# Patient Record
Sex: FEMALE | Race: WHITE | NOT HISPANIC OR LATINO | Employment: PART TIME | URBAN - METROPOLITAN AREA
[De-identification: names, ages, dates, MRNs, and addresses within clinical notes are randomized per-mention and may not be internally consistent; named-entity substitution may affect disease eponyms.]

---

## 2017-01-06 ENCOUNTER — COMMUNICATION - HEALTHEAST (OUTPATIENT)
Dept: FAMILY MEDICINE | Facility: CLINIC | Age: 57
End: 2017-01-06

## 2017-01-06 DIAGNOSIS — M79.7 FIBROMYALGIA: ICD-10-CM

## 2017-01-27 ENCOUNTER — COMMUNICATION - HEALTHEAST (OUTPATIENT)
Dept: FAMILY MEDICINE | Facility: CLINIC | Age: 57
End: 2017-01-27

## 2017-02-23 ENCOUNTER — COMMUNICATION - HEALTHEAST (OUTPATIENT)
Dept: FAMILY MEDICINE | Facility: CLINIC | Age: 57
End: 2017-02-23

## 2017-03-29 ENCOUNTER — COMMUNICATION - HEALTHEAST (OUTPATIENT)
Dept: FAMILY MEDICINE | Facility: CLINIC | Age: 57
End: 2017-03-29

## 2017-05-05 ENCOUNTER — COMMUNICATION - HEALTHEAST (OUTPATIENT)
Dept: FAMILY MEDICINE | Facility: CLINIC | Age: 57
End: 2017-05-05

## 2017-05-19 ENCOUNTER — COMMUNICATION - HEALTHEAST (OUTPATIENT)
Dept: FAMILY MEDICINE | Facility: CLINIC | Age: 57
End: 2017-05-19

## 2017-05-19 DIAGNOSIS — M79.7 FIBROMYALGIA: ICD-10-CM

## 2017-06-19 ENCOUNTER — COMMUNICATION - HEALTHEAST (OUTPATIENT)
Dept: FAMILY MEDICINE | Facility: CLINIC | Age: 57
End: 2017-06-19

## 2017-06-19 DIAGNOSIS — M79.7 FIBROMYALGIA: ICD-10-CM

## 2017-07-26 ENCOUNTER — COMMUNICATION - HEALTHEAST (OUTPATIENT)
Dept: FAMILY MEDICINE | Facility: CLINIC | Age: 57
End: 2017-07-26

## 2017-07-26 DIAGNOSIS — M79.7 FIBROMYALGIA: ICD-10-CM

## 2017-09-03 ENCOUNTER — OFFICE VISIT - HEALTHEAST (OUTPATIENT)
Dept: FAMILY MEDICINE | Facility: CLINIC | Age: 57
End: 2017-09-03

## 2017-09-03 DIAGNOSIS — R21 RASH/SKIN ERUPTION: ICD-10-CM

## 2018-01-12 ENCOUNTER — OFFICE VISIT - HEALTHEAST (OUTPATIENT)
Dept: FAMILY MEDICINE | Facility: CLINIC | Age: 58
End: 2018-01-12

## 2018-01-12 ENCOUNTER — RECORDS - HEALTHEAST (OUTPATIENT)
Dept: GENERAL RADIOLOGY | Facility: CLINIC | Age: 58
End: 2018-01-12

## 2018-01-12 DIAGNOSIS — M25.551 PAIN IN RIGHT HIP: ICD-10-CM

## 2018-01-12 DIAGNOSIS — M70.61 TROCHANTERIC BURSITIS, RIGHT HIP: ICD-10-CM

## 2018-01-12 DIAGNOSIS — M16.11 OSTEOARTHRITIS OF RIGHT HIP: ICD-10-CM

## 2018-01-12 DIAGNOSIS — M25.551 RIGHT HIP PAIN: ICD-10-CM

## 2018-01-26 ENCOUNTER — COMMUNICATION - HEALTHEAST (OUTPATIENT)
Dept: FAMILY MEDICINE | Facility: CLINIC | Age: 58
End: 2018-01-26

## 2018-03-19 ENCOUNTER — COMMUNICATION - HEALTHEAST (OUTPATIENT)
Dept: FAMILY MEDICINE | Facility: CLINIC | Age: 58
End: 2018-03-19

## 2018-03-19 DIAGNOSIS — M25.50 ARTHRALGIA: ICD-10-CM

## 2018-04-03 ENCOUNTER — RECORDS - HEALTHEAST (OUTPATIENT)
Dept: ADMINISTRATIVE | Facility: OTHER | Age: 58
End: 2018-04-03

## 2018-04-15 ENCOUNTER — COMMUNICATION - HEALTHEAST (OUTPATIENT)
Dept: FAMILY MEDICINE | Facility: CLINIC | Age: 58
End: 2018-04-15

## 2018-04-15 DIAGNOSIS — Z51.81 MEDICATION MONITORING ENCOUNTER: ICD-10-CM

## 2018-05-18 ENCOUNTER — AMBULATORY - HEALTHEAST (OUTPATIENT)
Dept: LAB | Facility: CLINIC | Age: 58
End: 2018-05-18

## 2018-05-18 DIAGNOSIS — Z51.81 MEDICATION MONITORING ENCOUNTER: ICD-10-CM

## 2018-05-18 LAB
ALBUMIN SERPL-MCNC: 3.8 G/DL (ref 3.5–5)
ALP SERPL-CCNC: 57 U/L (ref 45–120)
ALT SERPL W P-5'-P-CCNC: 20 U/L (ref 0–45)
AST SERPL W P-5'-P-CCNC: 21 U/L (ref 0–40)
BILIRUB DIRECT SERPL-MCNC: 0.2 MG/DL
BILIRUB SERPL-MCNC: 0.8 MG/DL (ref 0–1)
PROT SERPL-MCNC: 7.5 G/DL (ref 6–8)

## 2018-06-14 ENCOUNTER — OFFICE VISIT - HEALTHEAST (OUTPATIENT)
Dept: FAMILY MEDICINE | Facility: CLINIC | Age: 58
End: 2018-06-14

## 2018-06-14 DIAGNOSIS — Z51.81 MEDICATION MONITORING ENCOUNTER: ICD-10-CM

## 2018-06-14 DIAGNOSIS — J44.1 OBSTRUCTIVE CHRONIC BRONCHITIS WITH EXACERBATION (H): ICD-10-CM

## 2018-06-14 DIAGNOSIS — J18.9 PNEUMONIA: ICD-10-CM

## 2018-06-14 DIAGNOSIS — Z12.31 VISIT FOR SCREENING MAMMOGRAM: ICD-10-CM

## 2018-06-14 LAB
ALBUMIN SERPL-MCNC: 3.6 G/DL (ref 3.5–5)
ALP SERPL-CCNC: 56 U/L (ref 45–120)
ALT SERPL W P-5'-P-CCNC: 19 U/L (ref 0–45)
AST SERPL W P-5'-P-CCNC: 24 U/L (ref 0–40)
BILIRUB DIRECT SERPL-MCNC: 0.2 MG/DL
BILIRUB SERPL-MCNC: 0.5 MG/DL (ref 0–1)
PROT SERPL-MCNC: 7 G/DL (ref 6–8)

## 2018-06-14 RX ORDER — BUDESONIDE AND FORMOTEROL FUMARATE DIHYDRATE 160; 4.5 UG/1; UG/1
2 AEROSOL RESPIRATORY (INHALATION) 2 TIMES DAILY
Qty: 10.2 INHALER | Refills: 1 | Status: SHIPPED | OUTPATIENT
Start: 2018-06-14 | End: 2022-11-11

## 2018-06-14 ASSESSMENT — MIFFLIN-ST. JEOR: SCORE: 1432.25

## 2018-07-06 ENCOUNTER — HOSPITAL ENCOUNTER (OUTPATIENT)
Dept: MAMMOGRAPHY | Facility: CLINIC | Age: 58
Discharge: HOME | End: 2018-07-06

## 2018-07-06 DIAGNOSIS — Z12.31 VISIT FOR SCREENING MAMMOGRAM: ICD-10-CM

## 2018-07-22 ENCOUNTER — OFFICE VISIT - HEALTHEAST (OUTPATIENT)
Dept: FAMILY MEDICINE | Facility: CLINIC | Age: 58
End: 2018-07-22

## 2018-07-22 DIAGNOSIS — J02.9 PHARYNGITIS, UNSPECIFIED ETIOLOGY: ICD-10-CM

## 2018-07-22 LAB — DEPRECATED S PYO AG THROAT QL EIA: NORMAL

## 2018-07-22 RX ORDER — FOLIC ACID 1 MG/1
1 TABLET ORAL
Status: SHIPPED | COMMUNITY
Start: 2018-04-03

## 2018-07-23 LAB — GROUP A STREP BY PCR: NORMAL

## 2018-07-25 ENCOUNTER — AMBULATORY - HEALTHEAST (OUTPATIENT)
Dept: LAB | Facility: CLINIC | Age: 58
End: 2018-07-25

## 2018-07-25 DIAGNOSIS — Z51.81 MEDICATION MONITORING ENCOUNTER: ICD-10-CM

## 2018-07-25 LAB
ALBUMIN SERPL-MCNC: 3.9 G/DL (ref 3.5–5)
ALP SERPL-CCNC: 53 U/L (ref 45–120)
ALT SERPL W P-5'-P-CCNC: 20 U/L (ref 0–45)
AST SERPL W P-5'-P-CCNC: 19 U/L (ref 0–40)
BILIRUB DIRECT SERPL-MCNC: 0.2 MG/DL
BILIRUB SERPL-MCNC: 0.6 MG/DL (ref 0–1)
PROT SERPL-MCNC: 7.3 G/DL (ref 6–8)

## 2018-08-14 ENCOUNTER — COMMUNICATION - HEALTHEAST (OUTPATIENT)
Dept: FAMILY MEDICINE | Facility: CLINIC | Age: 58
End: 2018-08-14

## 2018-08-14 DIAGNOSIS — J44.1 OBSTRUCTIVE CHRONIC BRONCHITIS WITH EXACERBATION (H): ICD-10-CM

## 2018-08-14 DIAGNOSIS — Z22.7 TB LUNG, LATENT: ICD-10-CM

## 2018-08-14 DIAGNOSIS — R05.8 PRODUCTIVE COUGH: ICD-10-CM

## 2018-08-14 DIAGNOSIS — J18.9 PNEUMONIA: ICD-10-CM

## 2018-10-26 ENCOUNTER — RECORDS - HEALTHEAST (OUTPATIENT)
Dept: ADMINISTRATIVE | Facility: OTHER | Age: 58
End: 2018-10-26

## 2018-12-19 ENCOUNTER — OFFICE VISIT - HEALTHEAST (OUTPATIENT)
Dept: FAMILY MEDICINE | Facility: CLINIC | Age: 58
End: 2018-12-19

## 2018-12-19 ENCOUNTER — COMMUNICATION - HEALTHEAST (OUTPATIENT)
Dept: FAMILY MEDICINE | Facility: CLINIC | Age: 58
End: 2018-12-19

## 2018-12-19 ENCOUNTER — RECORDS - HEALTHEAST (OUTPATIENT)
Dept: GENERAL RADIOLOGY | Facility: CLINIC | Age: 58
End: 2018-12-19

## 2018-12-19 DIAGNOSIS — M17.11 PATELLOFEMORAL ARTHRITIS OF RIGHT KNEE: ICD-10-CM

## 2018-12-19 DIAGNOSIS — M17.11 PRIMARY OSTEOARTHRITIS OF RIGHT KNEE: ICD-10-CM

## 2018-12-19 DIAGNOSIS — M17.11 UNILATERAL PRIMARY OSTEOARTHRITIS, RIGHT KNEE: ICD-10-CM

## 2018-12-31 ENCOUNTER — RECORDS - HEALTHEAST (OUTPATIENT)
Dept: ADMINISTRATIVE | Facility: OTHER | Age: 58
End: 2018-12-31

## 2019-01-11 ENCOUNTER — OFFICE VISIT - HEALTHEAST (OUTPATIENT)
Dept: FAMILY MEDICINE | Facility: CLINIC | Age: 59
End: 2019-01-11

## 2019-01-11 DIAGNOSIS — S83.206S ACUTE TORN MENISCUS OF KNEE, RIGHT, SEQUELA: ICD-10-CM

## 2019-01-11 DIAGNOSIS — M17.12 PRIMARY OSTEOARTHRITIS OF LEFT KNEE: ICD-10-CM

## 2019-01-11 DIAGNOSIS — J44.1 OBSTRUCTIVE CHRONIC BRONCHITIS WITH EXACERBATION (H): ICD-10-CM

## 2019-01-11 DIAGNOSIS — Z01.818 PRE-OP EXAM: ICD-10-CM

## 2019-01-11 DIAGNOSIS — M79.7 FIBROMYALGIA: ICD-10-CM

## 2019-01-11 LAB — HGB BLD-MCNC: 14 G/DL (ref 12–16)

## 2019-01-11 ASSESSMENT — MIFFLIN-ST. JEOR: SCORE: 1432.7

## 2019-01-17 ENCOUNTER — COMMUNICATION - HEALTHEAST (OUTPATIENT)
Dept: FAMILY MEDICINE | Facility: CLINIC | Age: 59
End: 2019-01-17

## 2019-01-18 ENCOUNTER — RECORDS - HEALTHEAST (OUTPATIENT)
Dept: ADMINISTRATIVE | Facility: OTHER | Age: 59
End: 2019-01-18

## 2019-01-29 ENCOUNTER — RECORDS - HEALTHEAST (OUTPATIENT)
Dept: ADMINISTRATIVE | Facility: OTHER | Age: 59
End: 2019-01-29

## 2019-03-18 ENCOUNTER — RECORDS - HEALTHEAST (OUTPATIENT)
Dept: ADMINISTRATIVE | Facility: OTHER | Age: 59
End: 2019-03-18

## 2019-03-20 ENCOUNTER — OFFICE VISIT - HEALTHEAST (OUTPATIENT)
Dept: FAMILY MEDICINE | Facility: CLINIC | Age: 59
End: 2019-03-20

## 2019-03-20 DIAGNOSIS — R39.15 URINARY URGENCY: ICD-10-CM

## 2019-03-20 DIAGNOSIS — R10.9 RIGHT FLANK DISCOMFORT: ICD-10-CM

## 2019-03-20 LAB
ALBUMIN UR-MCNC: NEGATIVE MG/DL
APPEARANCE UR: CLEAR
BACTERIA #/AREA URNS HPF: ABNORMAL HPF
BILIRUB UR QL STRIP: NEGATIVE
COLOR UR AUTO: YELLOW
GLUCOSE UR STRIP-MCNC: NEGATIVE MG/DL
HGB UR QL STRIP: ABNORMAL
KETONES UR STRIP-MCNC: NEGATIVE MG/DL
LEUKOCYTE ESTERASE UR QL STRIP: NEGATIVE
MUCOUS THREADS #/AREA URNS LPF: ABNORMAL LPF
NITRATE UR QL: NEGATIVE
PH UR STRIP: 5 [PH] (ref 5–8)
RBC #/AREA URNS AUTO: ABNORMAL HPF
SP GR UR STRIP: 1.02 (ref 1–1.03)
SQUAMOUS #/AREA URNS AUTO: ABNORMAL LPF
TRANS CELLS #/AREA URNS HPF: ABNORMAL LPF
UROBILINOGEN UR STRIP-ACNC: ABNORMAL
WBC #/AREA URNS AUTO: ABNORMAL HPF

## 2019-03-22 ENCOUNTER — SURGERY - HEALTHEAST (OUTPATIENT)
Dept: UROLOGY | Facility: CLINIC | Age: 59
End: 2019-03-22

## 2019-03-22 ENCOUNTER — PATIENT OUTREACH (OUTPATIENT)
Dept: CARE COORDINATION | Facility: CLINIC | Age: 59
End: 2019-03-22

## 2019-03-22 ENCOUNTER — OFFICE VISIT - HEALTHEAST (OUTPATIENT)
Dept: UROLOGY | Facility: CLINIC | Age: 59
End: 2019-03-22

## 2019-03-22 ENCOUNTER — COMMUNICATION - HEALTHEAST (OUTPATIENT)
Dept: SCHEDULING | Facility: CLINIC | Age: 59
End: 2019-03-22

## 2019-03-22 DIAGNOSIS — R11.2 NAUSEA AND VOMITING, INTRACTABILITY OF VOMITING NOT SPECIFIED, UNSPECIFIED VOMITING TYPE: ICD-10-CM

## 2019-03-22 DIAGNOSIS — N13.2 HYDRONEPHROSIS WITH URINARY OBSTRUCTION DUE TO URETERAL CALCULUS: ICD-10-CM

## 2019-03-22 DIAGNOSIS — N23 RENAL COLIC: ICD-10-CM

## 2019-03-22 DIAGNOSIS — N20.1 CALCULUS OF URETER: ICD-10-CM

## 2019-03-22 LAB
ALBUMIN UR-MCNC: NEGATIVE MG/DL
APPEARANCE UR: CLEAR
BILIRUB UR QL STRIP: NEGATIVE
COLOR UR AUTO: YELLOW
GLUCOSE UR STRIP-MCNC: NEGATIVE MG/DL
HGB UR QL STRIP: ABNORMAL
KETONES UR STRIP-MCNC: NEGATIVE MG/DL
LEUKOCYTE ESTERASE UR QL STRIP: NEGATIVE
NITRATE UR QL: NEGATIVE
PH UR STRIP: 6.5 [PH] (ref 5–8)
SP GR UR STRIP: 1.01 (ref 1–1.03)
UROBILINOGEN UR STRIP-ACNC: ABNORMAL

## 2019-03-22 NOTE — PROGRESS NOTES
Clinic Care Coordination Contact  [facility]/Voicemail    Referral Source: ED Follow-Up  Clinical Data: Care Coordinator Outreach  Outreach attempted x 1.  Left message on voicemail with call back information and requested return call.  Plan:  Care Coordinator will try to reach patient again in 1-2 business days.  Janice Pro,   [facility]  [email or web address]  [phone]

## 2019-03-25 ENCOUNTER — SURGERY - HEALTHEAST (OUTPATIENT)
Dept: SURGERY | Facility: CLINIC | Age: 59
End: 2019-03-25

## 2019-03-25 ENCOUNTER — ANESTHESIA - HEALTHEAST (OUTPATIENT)
Dept: SURGERY | Facility: CLINIC | Age: 59
End: 2019-03-25

## 2019-03-25 ASSESSMENT — MIFFLIN-ST. JEOR: SCORE: 1403.25

## 2019-04-22 ENCOUNTER — COMMUNICATION - HEALTHEAST (OUTPATIENT)
Dept: FAMILY MEDICINE | Facility: CLINIC | Age: 59
End: 2019-04-22

## 2019-04-29 ENCOUNTER — COMMUNICATION - HEALTHEAST (OUTPATIENT)
Dept: FAMILY MEDICINE | Facility: CLINIC | Age: 59
End: 2019-04-29

## 2019-05-02 ENCOUNTER — DOCUMENTATION ONLY (OUTPATIENT)
Dept: OTHER | Facility: CLINIC | Age: 59
End: 2019-05-02

## 2019-05-02 ENCOUNTER — AMBULATORY - HEALTHEAST (OUTPATIENT)
Dept: OTHER | Facility: CLINIC | Age: 59
End: 2019-05-02

## 2019-07-22 ENCOUNTER — OFFICE VISIT - HEALTHEAST (OUTPATIENT)
Dept: FAMILY MEDICINE | Facility: CLINIC | Age: 59
End: 2019-07-22

## 2019-07-22 DIAGNOSIS — I73.9 CLAUDICATION (H): ICD-10-CM

## 2019-07-22 DIAGNOSIS — M79.661 RIGHT CALF PAIN: ICD-10-CM

## 2019-07-24 ENCOUNTER — HOSPITAL ENCOUNTER (OUTPATIENT)
Dept: ULTRASOUND IMAGING | Facility: CLINIC | Age: 59
Discharge: HOME | End: 2019-07-24
Attending: FAMILY MEDICINE

## 2019-07-24 DIAGNOSIS — M79.661 RIGHT CALF PAIN: ICD-10-CM

## 2019-07-24 DIAGNOSIS — I73.9 CLAUDICATION (H): ICD-10-CM

## 2019-08-01 ENCOUNTER — RECORDS - HEALTHEAST (OUTPATIENT)
Dept: ADMINISTRATIVE | Facility: OTHER | Age: 59
End: 2019-08-01

## 2019-08-27 ENCOUNTER — RECORDS - HEALTHEAST (OUTPATIENT)
Dept: ADMINISTRATIVE | Facility: OTHER | Age: 59
End: 2019-08-27

## 2019-09-10 ENCOUNTER — RECORDS - HEALTHEAST (OUTPATIENT)
Dept: ADMINISTRATIVE | Facility: OTHER | Age: 59
End: 2019-09-10

## 2019-11-29 ENCOUNTER — OFFICE VISIT - HEALTHEAST (OUTPATIENT)
Dept: FAMILY MEDICINE | Facility: CLINIC | Age: 59
End: 2019-11-29

## 2019-11-29 ENCOUNTER — RECORDS - HEALTHEAST (OUTPATIENT)
Dept: GENERAL RADIOLOGY | Facility: CLINIC | Age: 59
End: 2019-11-29

## 2019-11-29 DIAGNOSIS — M25.531 PAIN IN RIGHT WRIST: ICD-10-CM

## 2019-11-29 DIAGNOSIS — M25.531 RIGHT WRIST PAIN: ICD-10-CM

## 2019-11-29 DIAGNOSIS — M19.90 INFLAMMATORY ARTHRITIS: ICD-10-CM

## 2019-12-05 ENCOUNTER — COMMUNICATION - HEALTHEAST (OUTPATIENT)
Dept: FAMILY MEDICINE | Facility: CLINIC | Age: 59
End: 2019-12-05

## 2019-12-17 ENCOUNTER — OFFICE VISIT - HEALTHEAST (OUTPATIENT)
Dept: FAMILY MEDICINE | Facility: CLINIC | Age: 59
End: 2019-12-17

## 2019-12-17 DIAGNOSIS — J18.9 PNEUMONIA OF LEFT UPPER LOBE DUE TO INFECTIOUS ORGANISM: ICD-10-CM

## 2019-12-17 DIAGNOSIS — R50.9 FEVER, UNSPECIFIED FEVER CAUSE: ICD-10-CM

## 2019-12-17 DIAGNOSIS — J44.1 OBSTRUCTIVE CHRONIC BRONCHITIS WITH EXACERBATION (H): ICD-10-CM

## 2019-12-17 DIAGNOSIS — R05.9 COUGH: ICD-10-CM

## 2019-12-17 LAB
FLUAV AG SPEC QL IA: NORMAL
FLUBV AG SPEC QL IA: NORMAL

## 2019-12-19 ENCOUNTER — OFFICE VISIT - HEALTHEAST (OUTPATIENT)
Dept: FAMILY MEDICINE | Facility: CLINIC | Age: 59
End: 2019-12-19

## 2019-12-19 DIAGNOSIS — J18.9 PNEUMONIA OF LEFT UPPER LOBE DUE TO INFECTIOUS ORGANISM: ICD-10-CM

## 2019-12-19 DIAGNOSIS — J44.1 OBSTRUCTIVE CHRONIC BRONCHITIS WITH EXACERBATION (H): ICD-10-CM

## 2019-12-19 DIAGNOSIS — M06.9 RHEUMATOID ARTHRITIS, INVOLVING UNSPECIFIED SITE, UNSPECIFIED RHEUMATOID FACTOR PRESENCE: ICD-10-CM

## 2019-12-19 ASSESSMENT — MIFFLIN-ST. JEOR: SCORE: 1393.69

## 2020-01-17 ENCOUNTER — AMBULATORY - HEALTHEAST (OUTPATIENT)
Dept: FAMILY MEDICINE | Facility: CLINIC | Age: 60
End: 2020-01-17

## 2020-01-17 RX ORDER — PREGABALIN 75 MG/1
75 CAPSULE ORAL DAILY
Status: SHIPPED | COMMUNITY
Start: 2020-01-17

## 2020-01-20 ENCOUNTER — OFFICE VISIT - HEALTHEAST (OUTPATIENT)
Dept: FAMILY MEDICINE | Facility: CLINIC | Age: 60
End: 2020-01-20

## 2020-01-20 ENCOUNTER — RECORDS - HEALTHEAST (OUTPATIENT)
Dept: GENERAL RADIOLOGY | Facility: CLINIC | Age: 60
End: 2020-01-20

## 2020-01-20 DIAGNOSIS — J18.9 PNEUMONIA OF LEFT UPPER LOBE DUE TO INFECTIOUS ORGANISM: ICD-10-CM

## 2020-01-20 DIAGNOSIS — J18.1 LOBAR PNEUMONIA, UNSPECIFIED ORGANISM (H): ICD-10-CM

## 2020-01-20 DIAGNOSIS — M25.531 RIGHT WRIST PAIN: ICD-10-CM

## 2020-01-20 DIAGNOSIS — J44.1 OBSTRUCTIVE CHRONIC BRONCHITIS WITH EXACERBATION (H): ICD-10-CM

## 2020-01-27 ENCOUNTER — RECORDS - HEALTHEAST (OUTPATIENT)
Dept: ADMINISTRATIVE | Facility: OTHER | Age: 60
End: 2020-01-27

## 2020-03-16 ENCOUNTER — RECORDS - HEALTHEAST (OUTPATIENT)
Dept: ADMINISTRATIVE | Facility: OTHER | Age: 60
End: 2020-03-16

## 2020-04-15 ENCOUNTER — RECORDS - HEALTHEAST (OUTPATIENT)
Dept: ADMINISTRATIVE | Facility: OTHER | Age: 60
End: 2020-04-15

## 2020-06-02 ENCOUNTER — OFFICE VISIT - HEALTHEAST (OUTPATIENT)
Dept: FAMILY MEDICINE | Facility: CLINIC | Age: 60
End: 2020-06-02

## 2020-06-02 DIAGNOSIS — Z01.818 PRE-OPERATIVE EXAMINATION: ICD-10-CM

## 2020-06-02 DIAGNOSIS — E66.01 MORBID OBESITY (H): ICD-10-CM

## 2020-06-02 DIAGNOSIS — M17.11 OSTEOARTHRITIS OF RIGHT KNEE, UNSPECIFIED OSTEOARTHRITIS TYPE: ICD-10-CM

## 2020-06-02 DIAGNOSIS — M06.9 RHEUMATOID ARTHRITIS, INVOLVING UNSPECIFIED SITE, UNSPECIFIED RHEUMATOID FACTOR PRESENCE: ICD-10-CM

## 2020-06-02 DIAGNOSIS — G47.30 SLEEP APNEA, UNSPECIFIED TYPE: ICD-10-CM

## 2020-06-02 DIAGNOSIS — J44.1 OBSTRUCTIVE CHRONIC BRONCHITIS WITH EXACERBATION (H): ICD-10-CM

## 2020-06-02 LAB
ANION GAP SERPL CALCULATED.3IONS-SCNC: 8 MMOL/L (ref 5–18)
BUN SERPL-MCNC: 14 MG/DL (ref 8–22)
CALCIUM SERPL-MCNC: 9.5 MG/DL (ref 8.5–10.5)
CHLORIDE BLD-SCNC: 102 MMOL/L (ref 98–107)
CO2 SERPL-SCNC: 28 MMOL/L (ref 22–31)
CREAT SERPL-MCNC: 0.87 MG/DL (ref 0.6–1.1)
ERYTHROCYTE [DISTWIDTH] IN BLOOD BY AUTOMATED COUNT: 12.5 % (ref 11–14.5)
GFR SERPL CREATININE-BSD FRML MDRD: >60 ML/MIN/1.73M2
GLUCOSE BLD-MCNC: 151 MG/DL (ref 70–125)
HCT VFR BLD AUTO: 41.9 % (ref 35–47)
HGB BLD-MCNC: 14 G/DL (ref 12–16)
MCH RBC QN AUTO: 30.7 PG (ref 27–34)
MCHC RBC AUTO-ENTMCNC: 33.4 G/DL (ref 32–36)
MCV RBC AUTO: 92 FL (ref 80–100)
PLATELET # BLD AUTO: 239 THOU/UL (ref 140–440)
PMV BLD AUTO: 8.6 FL (ref 7–10)
POTASSIUM BLD-SCNC: 4.5 MMOL/L (ref 3.5–5)
RBC # BLD AUTO: 4.56 MILL/UL (ref 3.8–5.4)
SODIUM SERPL-SCNC: 138 MMOL/L (ref 136–145)
WBC: 8.5 THOU/UL (ref 4–11)

## 2020-06-02 ASSESSMENT — MIFFLIN-ST. JEOR: SCORE: 1378.11

## 2020-06-03 LAB
ATRIAL RATE - MUSE: 83 BPM
DIASTOLIC BLOOD PRESSURE - MUSE: NORMAL
INTERPRETATION ECG - MUSE: NORMAL
P AXIS - MUSE: 55 DEGREES
PR INTERVAL - MUSE: 130 MS
QRS DURATION - MUSE: 74 MS
QT - MUSE: 374 MS
QTC - MUSE: 439 MS
R AXIS - MUSE: -29 DEGREES
SYSTOLIC BLOOD PRESSURE - MUSE: NORMAL
T AXIS - MUSE: 69 DEGREES
VENTRICULAR RATE- MUSE: 83 BPM

## 2020-06-04 ENCOUNTER — RECORDS - HEALTHEAST (OUTPATIENT)
Dept: ADMINISTRATIVE | Facility: OTHER | Age: 60
End: 2020-06-04

## 2020-06-10 ENCOUNTER — HOSPITAL ENCOUNTER (OUTPATIENT)
Dept: MRI IMAGING | Facility: CLINIC | Age: 60
Discharge: HOME | End: 2020-06-10

## 2020-06-10 DIAGNOSIS — M25.531 RIGHT WRIST PAIN: ICD-10-CM

## 2020-06-15 ENCOUNTER — RECORDS - HEALTHEAST (OUTPATIENT)
Dept: ADMINISTRATIVE | Facility: OTHER | Age: 60
End: 2020-06-15

## 2020-07-01 ENCOUNTER — RECORDS - HEALTHEAST (OUTPATIENT)
Dept: ADMINISTRATIVE | Facility: OTHER | Age: 60
End: 2020-07-01

## 2020-07-24 ENCOUNTER — RECORDS - HEALTHEAST (OUTPATIENT)
Dept: ADMINISTRATIVE | Facility: OTHER | Age: 60
End: 2020-07-24

## 2020-08-05 ENCOUNTER — RECORDS - HEALTHEAST (OUTPATIENT)
Dept: ADMINISTRATIVE | Facility: OTHER | Age: 60
End: 2020-08-05

## 2020-09-11 ENCOUNTER — RECORDS - HEALTHEAST (OUTPATIENT)
Dept: ADMINISTRATIVE | Facility: OTHER | Age: 60
End: 2020-09-11

## 2020-10-09 ENCOUNTER — COMMUNICATION - HEALTHEAST (OUTPATIENT)
Dept: FAMILY MEDICINE | Facility: CLINIC | Age: 60
End: 2020-10-09

## 2020-10-20 ENCOUNTER — HOSPITAL ENCOUNTER (OUTPATIENT)
Dept: MAMMOGRAPHY | Facility: CLINIC | Age: 60
Discharge: HOME | End: 2020-10-20
Attending: FAMILY MEDICINE

## 2020-10-20 DIAGNOSIS — Z12.31 VISIT FOR SCREENING MAMMOGRAM: ICD-10-CM

## 2020-11-09 ENCOUNTER — AMBULATORY - HEALTHEAST (OUTPATIENT)
Dept: NURSING | Facility: CLINIC | Age: 60
End: 2020-11-09

## 2021-01-26 ENCOUNTER — RECORDS - HEALTHEAST (OUTPATIENT)
Dept: ADMINISTRATIVE | Facility: OTHER | Age: 61
End: 2021-01-26

## 2021-01-29 ENCOUNTER — VIRTUAL VISIT (OUTPATIENT)
Dept: FAMILY MEDICINE | Facility: OTHER | Age: 61
End: 2021-01-29

## 2021-01-29 ENCOUNTER — IMMUNIZATION (OUTPATIENT)
Dept: NURSING | Facility: CLINIC | Age: 61
End: 2021-01-29
Payer: MEDICARE

## 2021-01-29 PROCEDURE — 91300 PR COVID VAC PFIZER DIL RECON 30 MCG/0.3 ML IM: CPT

## 2021-01-29 PROCEDURE — 0001A PR COVID VAC PFIZER DIL RECON 30 MCG/0.3 ML IM: CPT

## 2021-01-29 NOTE — PROGRESS NOTES
"Date: 2021 06:37:16  Clinician: Justine Mccrary  Clinician NPI: [id number]  Patient: Amy Sands  Patient : 1960  Patient Address: [address] [place] [address] [place] [address]  Patient Phone: [phone]  Visit Protocol: URI  Patient Summary:  Amy is a 60 year old ( : 1960 ) female who initiated a [facility] Visit for cold, sinus infection, or influenza. When asked the question \"Please sign me up to receive news, health information and promotions from [facility].\", Amy responded \"No\".    Amy states her symptoms started gradually 3-4 days ago.   Her symptoms consist of ear pain, a headache, rhinitis, and nasal congestion.   Symptom details     Nasal secretions: The color of her mucus is clear.    Headache: She states the headache is mild (1-3 on a 10 point pain scale).      Amy denies having chills, vomiting, myalgias, malaise, fever, cough, nausea, teeth pain, ageusia, diarrhea, wheezing, facial pain or pressure, anosmia, and sore throat. She also denies having recent facial or sinus surgery in the past 60 days, taking antibiotic medication in the past month, and double sickening (worsening symptoms after initial improvement). She is not experiencing dyspnea.    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 In the past 14 days, Amy has not worked or volunteered at a healthcare facility or group living setting.   In the past 14 days, she also has not lived in a congregate living setting.   Amy has not had a close contact with a laboratory-confirmed COVID-19 patient within 14 days of symptom onset.    Amy has been tested for COVID-19.      Date(s) of her COVID-19 test as reported by the patient (free text): 2020       Result of COVID-19 test as reported by the patient (free text): Neg       Type of test as reported by the patient (free text): Nasal        Amy has not received a " COVID-19 vaccine.   Pertinent medical history  She has not been told by her provider to avoid NSAIDs.   Amy does not get yeast infections when she takes antibiotics.   Amy does not have diabetes. She denies having immunosuppressive conditions (e.g., chemotherapy, HIV, organ transplant, long-term use of steroids or other immunosuppressive medications, splenectomy). She denies having congestive heart failure and severe COPD. She does not have asthma.   Amy does not need a return to work/school note.   Amy does not smoke or use smokeless tobacco.   Additional information as reported by the patient (free text): This feels like a sinus infection   Weight: 185 lbs    MEDICATIONS: methotrexate oral, ALLERGIES: NKDA  Clinician Response:  Dear Amy,  Based on the information provided, you have viral sinusitis, also known as a sinus infection. Sinus infections are caused by bacteria or a virus and symptoms are almost always identical. The difference between the 2 types of infections is timing.  Sinus infections start as viral infections and symptoms improve on their own in about 7 days. If symptoms have not improved after 7 days or have even worsened, a bacterial infection may have developed.  Medication information  Because you have a viral infection, antibiotics will not help you get better. Treating a viral infection with antibiotics could actually make you feel worse.  For more information on why I am not prescribing antibiotics, please watch this video: Antibiotics Aren't Always the Answer.  I am prescribing:     Fluticasone 50 mcg/actuation nasal spray. Inhale 2 sprays in each nostril 1 time per day; after 1 week, may adjust to 1 - 2 sprays in each nostril 1 time per day. This medication takes several days to start working, so keep taking it even if it doesn't help right away. There are no refills with this prescription.   Unless you are allergic to the over-the-counter medication(s) below, I recommend using:        Acetaminophen (Tylenol or store brand) oral tablet. Take 1-2 tablets by mouth every 4-6 hours to help with the discomfort.    A decongestant such as Sudafed PE or store brand.    A sinus irrigation kit such as Sinus Rinse, Neti Pot, SinuCleanse, or store brand. Be sure to use sterile or previously boiled water to prevent unwanted infections.      Oxymetazoline (Afrin or store brand) nasal spray. Use 1 spray in each nostril 2 times a day for a maximum of 3 days. Using this medication more frequently or longer than recommended may cause nasal congestion to reoccur or worsen. Sinus pressure occurs when the tissues lining your sinuses become swollen and inflamed. Afrin nasal spray decreases the swelling to provide the quickest and most effective relief from sinus pressure. Similar to Flonase, Afrin will help reduce swelling in your sinuses. Afrin works differently than Flonase, so be sure to use both nasal sprays.      Over-the-counter medications do not require a prescription. Ask the pharmacist if you have any questions.  Self care  Steps you can take to be as comfortable as possible:     Rest.    Drink plenty of fluids.    Take a warm shower to loosen congestion    Use a cool-mist humidifier.     When to seek care  Please be seen in a clinic or urgent care if any of the following occur:   New symptoms develop, or symptoms become worse   Call ahead before going to the clinic or urgent care.  Additional treatment plan   Your symptoms show that you may have coronavirus (COVID-19). This illness can cause fever, cough and trouble breathing. Many people get a mild case and get better on their own. Some people can get very sick.  What should I do?  We would like to test you for this virus.   1. Please call [phone] to schedule your visit. Explain that you were referred by [facility] to have a COVID-19 test. Be ready to share your [facility] visit ID number.  * If you need to schedule in [facility] please call  "[phone] or for [facility] [email or web address] please call [phone].  * If you need to schedule in the [place] area please call [phone]. [facility] employees call [phone].  The following will serve as your written order for this COVID Test, ordered by me, for the indication of suspected COVID [Z20.828]: The test will be ordered in [employer], our electronic health record, after you are scheduled. It will show as ordered and authorized by Jasiel Katz MD.  Order: COVID-19 (Coronavirus) PCR for SYMPTOMATIC testing from [facility].   2. When it's time for your COVID test:  Stay at least 6 feet away from others. (If someone will drive you to your test, stay in the backseat, as far away from the  as you can.)   Cover your mouth and nose with a mask, tissue or washcloth.  Go straight to the testing site. Don't make any stops on the way there or back.      3.Starting now: Stay home and away from others (self-isolate) until:   You've had no fever---and no medicine that reduces fever---for one full day (24 hours). And...   Your other symptoms have gotten better. For example, your cough or breathing has improved. And...   At least 10 days have passed since your symptoms started.       During this time, don't leave the house except for testing or medical care.   Stay in your own room, even for meals. Use your own bathroom if you can.   Stay away from others in your home. No hugging, kissing or shaking hands. No visitors.  Don't go to work, school or anywhere else.    Clean \"high touch\" surfaces often (doorknobs, counters, handles, etc.). Use a household cleaning spray or wipes. You'll find a full list of  on the EPA website: www.epa.gov/pesticide-registration/list-n-disinfectants-use-against-sars-cov-2.   Cover your mouth and nose with a mask, tissue or washcloth to avoid spreading germs.  Wash your hands and face often. Use soap and water.  Caregivers in these groups are at risk for severe illness due to COVID-19:  o " People 65 years and older  o People who live in a nursing home or long-term care facility  o People with chronic disease (lung, heart, cancer, diabetes, kidney, liver, immunologic)  o People who have a weakened immune system, including those who:   Are in cancer treatment  Take medicine that weakens the immune system, such as corticosteroids  Had a bone marrow or organ transplant  Have an immune deficiency  Have poorly controlled HIV or AIDS  Are obese (body mass index of 40 or higher)  Smoke regularly   o Caregivers should wear gloves while washing dishes, handling laundry and cleaning bedrooms and bathrooms.  o Use caution when washing and drying laundry: Don't shake dirty laundry, and use the warmest water setting that you can.  o For more tips, go to www.cdc.gov/coronavirus/2019-ncov/downloads/10Things.pdf.    4.Sign up for [employer]. We know it's scary to hear that you might have COVID-19. We want to track your symptoms to make sure you're okay over the next 2 weeks. Please look for an email from [employer]---this is a free, online program that we'll use to keep in touch. To sign up, follow the link in the email. Learn more at [email or web address]  How can I take care of myself?   Get lots of rest. Drink extra fluids (unless a doctor has told you not to).   Take Tylenol (acetaminophen) for fever or pain. If you have liver or kidney problems, ask your family doctor if it's okay to take Tylenol.   Adults can take either:    650 mg (two 325 mg pills) every 4 to 6 hours, or...   1,000 mg (two 500 mg pills) every 8 hours as needed.    Note: Don't take more than 3,000 mg in one day. Acetaminophen is found in many medicines (both prescribed and over-the-counter medicines). Read all labels to be sure you don't take too much.   For children, check the Tylenol bottle for the right dose. The dose is based on the child's age or weight.    If you have other health problems (like cancer, heart failure, an  organ transplant or severe kidney disease): Call your specialty clinic if you don't feel better in the next 2 days.       Know when to call 911. Emergency warning signs include:    Trouble breathing or shortness of breath Pain or pressure in the chest that doesn't go away Feeling confused like you haven't felt before, or not being able to wake up Bluish-colored lips or face.  Where can I get more information?   [facility] -- About COVID-19: [email or web address]   CDC -- What to Do If You're Sick: www.cdc.gov/coronavirus/2019-ncov/about/steps-when-sick.html   CDC -- Ending Home Isolation: www.cdc.gov/coronavirus/2019-ncov/hcp/disposition-in-home-patients.html   CDC -- Caring for Someone: www.cdc.gov/coronavirus/2019-ncov/if-you-are-sick/care-for-someone.html   [facility] -- Interim Guidance for Hospital Discharge to Home: www.[facility].[place]./diseases/coronavirus/hcp/hospdischarge.pdf   [facility] clinical trials (COVID-19 research studies): clinicalaffairs.[facility]-clinical-trials    Below are the COVID-19 hotlines at the [place] Department of Health ([facility]). Interpreters are available.    For health questions: Call 386-730-4145 or 1-740.996.8161 (7 a.m. to 7 p.m.) For questions about schools and childcare: Call 450-646-9455 or 1-255.814.6949 (7 a.m. to 7 p.m.)    Diagnosis: Acute upper respiratory infection, unspecified  Diagnosis ICD: J06.9  Prescription: fluticasone 50 mcg/actuation nasal spray,suspension 1 120 spray aerosol with adapter (grams), 30 days supply. Inhale 2 sprays in each nostril 1 time per day; after 1 week, may adjust to 1 - 2 sprays in each nostril 1 time per day.. Refills: 0, Refill as needed: no, Allow substitutions: yes  Pharmacy: [id number] - [phone] - [address] [place] [address]

## 2021-02-19 ENCOUNTER — IMMUNIZATION (OUTPATIENT)
Dept: NURSING | Facility: CLINIC | Age: 61
End: 2021-02-19
Attending: FAMILY MEDICINE
Payer: COMMERCIAL

## 2021-02-19 PROCEDURE — 0002A PR COVID VAC PFIZER DIL RECON 30 MCG/0.3 ML IM: CPT

## 2021-02-19 PROCEDURE — 91300 PR COVID VAC PFIZER DIL RECON 30 MCG/0.3 ML IM: CPT

## 2021-03-20 ENCOUNTER — HEALTH MAINTENANCE LETTER (OUTPATIENT)
Age: 61
End: 2021-03-20

## 2021-04-12 ENCOUNTER — RECORDS - HEALTHEAST (OUTPATIENT)
Dept: ADMINISTRATIVE | Facility: OTHER | Age: 61
End: 2021-04-12

## 2021-05-26 ENCOUNTER — RECORDS - HEALTHEAST (OUTPATIENT)
Dept: ADMINISTRATIVE | Facility: CLINIC | Age: 61
End: 2021-05-26

## 2021-05-26 NOTE — TELEPHONE ENCOUNTER
Clinic Care Coordination Contact  [facility]/Voicemail    Referral Source: ED Follow-Up  Clinical Data: Care Coordinator Outreach  Outreach attempted x 1.  Left message on voicemail with call back information and requested return call.  Plan:  Care Coordinator will try to reach patient again in 1-2 business days.  Janice Pro,   [facility]  [email or web address]  [phone]

## 2021-05-26 NOTE — PATIENT INSTRUCTIONS - HE
Patient Stated Goal: Know what to expect after surgery  Ureteroscopy    Ureteroscopy is a procedure which is done for clearance of stones from the ureter, kidney or both. There are no incisions involved. The procedure involves your surgeon placing a small scope into your urethra. This is the opening where urine leaves your body.  The surgeon watches as they carefully guide the scope to the stone(s).  Modern flexible ureteroscopes can be used to reach virtually any location within the urinary tract.     The size, shape and location of the stone determines how best to treat the stone(s).  Whenever possible, stones are removed in one piece.  Larger stones need to be broken using a laser before removing in smaller pieces.  The goal is to remove all stones and stone fragments from that side of the body in a single treatment.  Complete stone clearance is an important step to prevent future kidney stone episodes.    Surgery:    Same day outpatient procedure    30-60 minutes    Procedure done in hospital surgical suite    General anesthesia (you will be asleep during the procedure)     Antibiotic prior to surgery to prevent infection    Physician will visit with you and respond to any questions or concerns and consent will be signed prior to going to the operating room    Risks:    Infection - Preoperative antibiotics should prevent new infections but it is possible that unanticipated bacteria may be introduced at time of surgery or that the stones were actually chronically infected before surgery      Injury - The ureter may be injured during this procedure.  This is most likely to happen if the ureter was very inflamed before surgery or if a stone is very tightly impacted.  The surgeon will not aggressively treat a stone if this creates a risk of injury.        Inaccessible Stones -A single procedure is effective in 95% of cases, but if your ureter is very narrow or your kidney stone is very impacted, a stent will be  placed and the procedure stopped.  In 1-2 weeks after the ureter has relaxed, the patient will be brought back to surgery and the procedure can be safely performed.      Incomplete stone clearance -Occasionally stone or stone fragments may not be completely cleared.  These may pass on their own, which may cause discomfort.  Our goal is to remove all possible stones and fragments.    Stent:      An internal soft tube will be placed between the kidney and the bladder while in surgery (after the stone is cleared). The stent will keep the kidney draining.    What should I expect?     It is common for a stent to cause some irritation and discomfort.   You may have:      The need to urinate suddenly     The need to urinate often     Pain during urination     A dull backache, which may get worse during urination     Blood stained urine (like fruit punch) and occasional small clots    It s important to remember the stent is necessary and only temporary. To feel more comfortable:      Drink more than you normally would but you do not have to constantly  flush your kidneys     Limiting your activity may decrease irritation or bleeding    Ibuprofen - 2 tablets every 6-8 hours     Use pain medications as directed.    When is the stent removed?    Most stents are removed within 5 days to 2 weeks after a procedure.     How is the stent removed?     Your stent will be removed in the [facility] with a small telescope and a grasping tool.  It usually takes less than 1 minute to remove the stent.    What should I expect after the stent is removed?     You should feel normal by the next day    Some patients find:    An increase in back pain about an hour after the stent is removed as the kidney fills up with urine before it starts to empty.  It can be as uncomfortable as your initial stone episode.  Taking pain medications before stent removal may be helpful, but you would need someone else to drive you to and from your  appointment.    Bladder symptoms usually disappear by the next morning.    Small amounts of blood in the urine may be seen occasionally for up to a week.    Diet:      After surgery, there are no dietary restrictions - Drink to thirst, there is no need to increase intake of fluids, as this may increase nausea symptoms. Try to eat smaller, more frequent snacks, instead of large meals.    Activity:    Many people return to work within 1-2 days. Fatigue is normal for a couple of weeks following surgery. With increased activity you may experience more discomfort and you may notice more blood in your urine.      Post-Operative Symptom Control    While you recover from your procedure, you can take steps to ease your recovery.    Medications that prevent further episodes of severe pain and help stones pass: Take these as prescribed on a regular basis even if you are NOT in pain      Ibuprofen (Advil or Motrin) - Is available over the counter Take 2 (200mg) tablets every 6 hours until the stone passes.  o prevents spasm of the ureter.    o Decreases pain      Dramamine - (drowsy version, non-generic formulation) Is available over the counter and decreases spasm of the ureter.  Take 50mg at bedtime every night until the stone passes. In addition, take every 6 hours as needed.  Dramamine:  o Decreases nausea  o Decreases acute pain  o Decreases recurrence of pain for next 24 hours  o Will help you sleep        *This medication will cause increased drowsiness, do not drive or operate machinery for 6 hours      Flomax- Studies show that Flomax decreases irritation from stents.   o Take every day with food until stone passes even if you do not have pain  o Flomax does not relieve pain.        *This medication may cause nasal congestion or light-headedness      Detrol ( Tolterodine) - After surgery Detrol may decrease stent irritation and pelvic pain  o Take as prescribed     *This medication may cause dry mouth, constipation or  blurry vision. Stop medication if unable to urinate.    Medication that are taken as needed to manage break through symptoms: Take these ONLY as required and hopefully not at all      Narcotics (Percocet, Vicodin, Dilaudid)- take as prescribed for severe pain unrelieved by ibuprofen and dramamine  o Take as prescribed for severe pain  o Narcotics have significant side effects and only  cover-up  pain. They have no effect on cause of pain.  o Common side effects:  - Confusion, disorientation and sedation - DO NOT DRIVE OR OPERATE MACHINERY WITHIN 24 HOURS  - Nausea - take Dramamine or Zofran  or Haldol to help control  - Constipation  - Sleep disturbances      Ondansetron (Zofran)-  o Take as prescribed  o Reserve for severe nausea  o May cause constipation, start over the counter Miralax if needed to treat this    Haldol-  o Take as prescribed  o Reserve for severe nausea    Warning Signs/Symptoms - Please call the [facility] 24 hours a day at [phone] IMMEDIATELY if you experience any of these:    Fever greater than 100.1     Chills    Pain NOT CONTROLLED by pain medications    Heavy bleeding or large clots in urine (small clots can be normal)    Persistent nausea and/or vomiting    Post-Operative Follow up:    The stone(s) will be sent from surgery to a lab for composition analysis.  These results are usually available before a one month post-operative visit.  If you had laser treatment to break up your stone, you will usually be scheduled for a low dose CT scan prior to your one month appointment.  This scan allows your surgeon to confirm that all stone fragments were cleared at time of surgery and that there have been no complications.  These results along with possible labs and urine studies will help us develop an individualized plan to prevent new stones from forming and keep existing stones from enlarging.  This visit is usually scheduled about 1 month after your original surgery.    The  [facility] can respond to your questions or concerns 24 hours a day at [phone].

## 2021-05-26 NOTE — PROGRESS NOTES
Assessment/Plan:        Diagnoses and all orders for this visit:    Calculus of ureter  -     Cancel: Urinalysis Macroscopic  -     Urinalysis Macroscopic  -     Verify informed consent; Standing  -     Diet NPO; Standing  -     Place sequential compression device; Standing  -     XR Retrograde Pyelogram W or WO KUB Intraoperative; Standing  -     levoFLOXacin 500 mg/100 mL IVPB 500 mg (LEVAQUIN)  -     acetaminophen tablet 1,000 mg (TYLENOL)  -     sterile water IR irrigation solution 3,000 mL  -     Patient Stated Goal: Know what to expect after surgery  -     Ureteroscopy Education  -     XR Abdomen AP; Standing    Renal colic  -     Verify informed consent; Standing  -     Diet NPO; Standing  -     Place sequential compression device; Standing  -     XR Retrograde Pyelogram W or WO KUB Intraoperative; Standing  -     levoFLOXacin 500 mg/100 mL IVPB 500 mg (LEVAQUIN)  -     acetaminophen tablet 1,000 mg (TYLENOL)  -     sterile water IR irrigation solution 3,000 mL  -     Patient Stated Goal: Know what to expect after surgery  -     Ureteroscopy Education  -     XR Abdomen AP; Standing    Nausea and vomiting, intractability of vomiting not specified, unspecified vomiting type  -     Verify informed consent; Standing  -     Diet NPO; Standing  -     Place sequential compression device; Standing  -     XR Retrograde Pyelogram W or WO KUB Intraoperative; Standing  -     levoFLOXacin 500 mg/100 mL IVPB 500 mg (LEVAQUIN)  -     acetaminophen tablet 1,000 mg (TYLENOL)  -     sterile water IR irrigation solution 3,000 mL  -     Patient Stated Goal: Know what to expect after surgery  -     Ureteroscopy Education  -     XR Abdomen AP; Standing    Hydronephrosis with urinary obstruction due to ureteral calculus  -     Verify informed consent; Standing  -     Diet NPO; Standing  -     Place sequential compression device; Standing  -     XR Retrograde Pyelogram W or WO KUB Intraoperative; Standing  -     levoFLOXacin 500  mg/100 mL IVPB 500 mg (LEVAQUIN)  -     acetaminophen tablet 1,000 mg (TYLENOL)  -     sterile water IR irrigation solution 3,000 mL  -     Patient Stated Goal: Know what to expect after surgery  -     Ureteroscopy Education  -     XR Abdomen AP; Standing    Other orders  -     oxyCODONE (ROXICODONE) 5 MG immediate release tablet      Stone Management Plan  [facility] 3/22/2019   Current CT date 3/21/2019   Right sided stones? No   R Stone Event No current event   Left sided stones? Yes   L Number of ureteral stones 1   L GSD of ureteral stones 6   L Location of ureteral stone Distal   L Number of kidney stones  No renal stones   L Hydronephrosis Mild   L Stone Event New event   Diagnosis date 3/21/2019   Initial location of primary symptomatic stone Distal   Initial GSD of primary symptomatic stone 6   L MET Status Initiation   L Current Plan Observe             Subjective:      HPI  Ms. Amy Sands is a 58 y.o.  female presenting to the [facility] with history of onset of right flank pain on approximately 3/19/2019 accompanied by some urinary frequency and urgency.  She thought she might have a UTI.  Pain became severe 3/21/2019 and she presented to the emergency room where she was found to have 10-25 RBCs otherwise potassium 4.9 calcium 9.2 creatinine 0.92 white blood count 12,900 hemoglobin 12.9    Personal review of CT scan without IV or oral contrast obtained 3/21/2019 demonstrates a 6 x 4 mm left distal ureteral stone with mild hydroureteronephrosis.  There are no other stones seen.  Patient does have some small gallstones.    Patient comes at this time pain intermittently controlled.  She would like stone removed as soon as possible but on the other hand she just ate.  Informed her that this would not be feasible at this time with her having just eaten.  Ideally if she can control pain give her trial of passage.    UA today specific gravity 1.01 blood moderate pH  6.5- nitrate negative leukocytes.    Impression right distal ureteral stone with mild hydroureteronephrosis intermittent colic    Plan; trial of passage and if she has pain that is not tolerable she is to present to the emergency room here at [facility].  Tentatively she will be scheduled for clearance of stone early next week which she can cancel if her pain is under good control.    She does vomit when she has had Toradol both for her knee and recently when she was in.  She cannot take Dilaudid.  She has tolerated oxycodone.  She has available to her acetaminophen ibuprofen Dramamine and oxycodone and Zofran for symptom relief with her stone.    We did discuss clearance of stone with risks and benefits being detailed of ureteroscopic stone clearance including potential issues of urinary or systemic infection ureteral injury inaccessible stone incomplete stone clearance multiple surgeries and stent related symptoms of flank pain bladder pain urgency frequency and hematuria.  Patient verbalized understanding regards attempts at supportive therapy for stone passage as well as operative intervention should that be necessary on a semi-emergent  Or scheduled basis.    She is aware that if she in the difficulty surgical procedure of clearance of stone would be accomplished by Dr. Weston or Dr. Lopez.  She is tentatively scheduled for elective surgical removal of stone on Monday with Dr. Lopez.       ROS   Review of Systems  A 12 point comprehensive review of systems is negative except for HPI    Past Medical History:   Diagnosis Date     Arthritis      Chronic bronchitis (H)      Fibromyalgia      History of anesthesia complications     HARD WAKEUP AND LOW BP     PONV (postoperative nausea and vomiting)     motion sickness     Rosacea      Rotator cuff tendonitis, right      Sleep apnea     DOESNT USE CPAP       Past Surgical History:   Procedure Laterality Date     CARPAL TUNNEL RELEASE Bilateral       HYSTERECTOMY       KNEE ARTHROSCOPY Left      lysis of adhesions for bowel obstruction x 2       WA TOTAL KNEE ARTHROPLASTY Left 1/7/2016    Procedure: TOTAL KNEE  ARTHROPLASTY;  Surgeon: Boubacar Cortez MD;  Location: [facility];  Service: Orthopedics       Current Outpatient Medications   Medication Sig Dispense Refill     acetaminophen (TYLENOL) 500 MG tablet Take 2 tablets (1,000 mg total) by mouth 4 (four) times a day for 7 days. 56 tablet 0     albuterol (PROAIR HFA) 90 mcg/actuation inhaler Inhale 2 puffs every 6 (six) hours as needed for wheezing. 1 Inhaler 1     budesonide-formoterol (SYMBICORT) 160-4.5 mcg/actuation inhaler Inhale 2 puffs 2 (two) times a day. 10.2 Inhaler 1     budesonide-formoterol (SYMBICORT) 160-4.5 mcg/actuation inhaler Inhale 2 puffs.       budesonide-formoterol (SYMBICORT) 160-4.5 mcg/actuation inhaler Inhale 2 puffs 2 (two) times a day. 10.2 Inhaler 0     celecoxib (CELEBREX) 200 MG capsule TAKE 1 CAPSULE BY MOUTH EVERY DAY 30 capsule 0     dimenhyDRINATE (DRAMAMINE) 50 MG tablet Take 1 tablet (50 mg total) by mouth at bedtime for 7 days. 7 tablet 0     folic acid (FOLVITE) 1 MG tablet Take 1 mg by mouth.       gabapentin (NEURONTIN) 100 MG capsule Take 100 mg by mouth 3 (three) times a day.       ibuprofen (ADVIL,MOTRIN) 200 MG tablet Take 2 tablets (400 mg total) by mouth 4 (four) times a day for 7 days. 56 tablet 0     isoniazid (NYDRAZID) 300 MG tablet Take 1 tablet (300 mg total) by mouth daily. 30 tablet 8     LYRICA 75 mg capsule TAKE ONE CAPSULE BY MOUTH TWICE DAILY 60 capsule 0     methotrexate 2.5 MG tablet Take by mouth once a week.       methotrexate 2.5 MG tablet Take 12.5 mg by mouth.       ondansetron (ZOFRAN ODT) 4 MG disintegrating tablet Take 1 tablet (4 mg total) by mouth every 8 (eight) hours as needed for nausea. 12 tablet 0     oxyCODONE (ROXICODONE) 5 MG immediate release tablet Every 4-6 hours as needed if pain is not improved with acetaminophen  and ibuprofen. 12 tablet 0     pregabalin (LYRICA) 75 MG capsule Take 1 capsule (75 mg total) by mouth 2 (two) times a day. 60 capsule 1     pregabalin (LYRICA) 75 MG capsule Take 1 capsule (75 mg total) by mouth 2 (two) times a day. 180 capsule 1     pregabalin (LYRICA) 75 MG capsule Take 75 mg by mouth.       triamcinolone (KENALOG) 0.1 % cream Apply to affected areas two times daily 30 g 0     No current facility-administered medications for this visit.        Allergies   Allergen Reactions     Hydrocodone Nausea And Vomiting       Social History     Socioeconomic History     Marital status:      Spouse name: Not on file     Number of children: Not on file     Years of education: Not on file     Highest education level: Not on file   Occupational History     Not on file   Social Needs     Financial resource strain: Not on file     Food insecurity:     Worry: Not on file     Inability: Not on file     Transportation needs:     Medical: Not on file     Non-medical: Not on file   Tobacco Use     Smoking status: Passive Smoke Exposure - Never Smoker     Smokeless tobacco: Never Used   Substance and Sexual Activity     Alcohol use: No     Drug use: No     Sexual activity: Not on file   Lifestyle     Physical activity:     Days per week: Not on file     Minutes per session: Not on file     Stress: Not on file   Relationships     Social connections:     Talks on phone: Not on file     Gets together: Not on file     Attends [employer] service: Not on file     Active member of club or organization: Not on file     Attends meetings of clubs or organizations: Not on file     Relationship status: Not on file     Intimate partner violence:     Fear of current or ex partner: Not on file     Emotionally abused: Not on file     Physically abused: Not on file     Forced sexual activity: Not on file   Other Topics Concern     Not on file   Social History Narrative     Not on file       No family history on  file.    Objective:      Physical Exam  Vitals:    03/22/19 1401   Temp: 98  F (36.7  C)     General - well developed, well nourished, appropriate for age. Appears moderately uncomfortable   Heart - regular rate and rhythm, no murmur  Respiratory - normal effort, clear to auscultation, good air entry without adventitious noises  Abdomen - mildly obese soft, non-tender, no hepatosplenomegaly, no masses.   - right flank moderate tenderness, no suprapubic tenderness, kidney and bladder non-palpable  MSK - normal spinal curvature. no spinal tenderness. normal gait. muscular strength intact.  Neurology - cranial nerves II-XII grossly intact, normal sensation, no unsteadiness  Skin - intact, no bruising, no gouty tophi  Psych - oriented to time, place, and person, normal mood and affect.      Labs  Urinalysis POC (Office):  Nitrite, UA   Date Value Ref Range Status   03/22/2019 Negative Negative Final   03/21/2019 Negative Negative Final   03/20/2019 Negative Negative Final       Lab Urinalysis:  Blood, UA   Date Value Ref Range Status   03/22/2019 Moderate (!) Negative Final   03/21/2019 Small (!) Negative Final   03/20/2019 Trace (!) Negative Final     Nitrite, UA   Date Value Ref Range Status   03/22/2019 Negative Negative Final   03/21/2019 Negative Negative Final   03/20/2019 Negative Negative Final     Leukocytes, UA   Date Value Ref Range Status   03/22/2019 Negative Negative Final   03/21/2019 Small (!) Negative Final   03/20/2019 Negative Negative Final     pH, UA   Date Value Ref Range Status   03/22/2019 6.5 5.0 - 8.0 Final   03/21/2019 5.0 4.5 - 8.0 Final   03/20/2019 5.0 5.0 - 8.0 Final    and Acute Labs   CBC   WBC   Date Value Ref Range Status   03/21/2019 12.9 (H) 4.0 - 11.0 thou/uL Final   11/07/2016 9.7 4.0 - 11.0 thou/uL Final   05/31/2016 8.7 4.0 - 11.0 thou/uL Final   07/02/2015 8.3 4.0 - 11.0 thou/uL Final   12/04/2014 8.2 4.0 - 11.0 thou/uL Final   12/02/2014 7.8 4.0 - 11.0 thou/uL Final      Hemoglobin   Date Value Ref Range Status   03/21/2019 12.9 12.0 - 16.0 g/dL Final   01/11/2019 14.0 12.0 - 16.0 g/dL Final   11/07/2016 14.1 12.0 - 16.0 g/dL Final     Platelets   Date Value Ref Range Status   03/21/2019 241 140 - 440 thou/uL Final   11/07/2016 231 140 - 440 thou/uL Final   05/31/2016 237 140 - 440 thou/uL Final   , C Reactive Protein    CRP   Date Value Ref Range Status   03/21/2019 0.4 0.0 - 0.8 mg/dL Final    and Renal Panel  [facility]  Creatinine   Date Value Ref Range Status   03/21/2019 0.92 0.60 - 1.10 mg/dL Final   11/07/2016 0.82 0.60 - 1.10 mg/dL Final   05/31/2016 0.83 0.60 - 1.10 mg/dL Final     Potassium   Date Value Ref Range Status   03/21/2019 4.9 3.5 - 5.0 mmol/L Final   11/07/2016 4.8 3.5 - 5.0 mmol/L Final   05/31/2016 4.4 3.5 - 5.0 mmol/L Final     Calcium   Date Value Ref Range Status   03/21/2019 9.2 8.5 - 10.5 mg/dL Final   11/07/2016 9.8 8.5 - 10.5 mg/dL Final   05/31/2016 9.8 8.5 - 10.5 mg/dL Final

## 2021-05-27 NOTE — ANESTHESIA PREPROCEDURE EVALUATION
Anesthesia Evaluation      Patient summary reviewed   History of anesthetic complications (Slow to wake, PONV)     Airway   Mallampati: II  Neck ROM: full  Comment: Obese   Pulmonary - normal exam   (+) COPD (Chronic bronchitis, well controlled), sleep apnea on no CPAP, ,   (-) shortness of breath    ROS comment: Latent TB                         Cardiovascular - negative ROS and normal exam  Exercise tolerance: > or = 4 METS  Rhythm: regular  Rate: normal,         Neuro/Psych    (+) neuromuscular disease (Fibromyalgia),      Endo/Other    (+) arthritis, obesity (BMI 36),      GI/Hepatic/Renal    (+)   chronic renal disease (ureteral stone with mild hydroureteronephrosis),   (-) GERD          Dental - normal exam                        Anesthesia Plan  Planned anesthetic: general endotracheal  Glidescope    Reverse with Sugammadex    Scopolamine patch  Decadron  Zofran  ASA 2   Induction: intravenous   Anesthetic plan and risks discussed with: patient  Anesthesia plan special considerations: video-assisted, antiemetics,   Post-op plan: routine recovery

## 2021-05-27 NOTE — ANESTHESIA CARE TRANSFER NOTE
Last vitals:   Vitals:    03/25/19 1520   BP: 138/76   Pulse: 70   Resp: 14   Temp: 36.5  C (97.7  F)   SpO2: 100%     Patient's level of consciousness is drowsy  Spontaneous respirations: yes  Maintains airway independently: yes  Dentition unchanged: yes  Oropharynx: oropharynx clear of all foreign objects    QCDR Measures:  ASA# 20 - Surgical Safety Checklist: WHO surgical safety checklist completed prior to induction    PQRS# 430 - Adult PONV Prevention: 4558F - Pt received => 2 anti-emetic agents (different classes) preop & intraop  ASA# 8 - Peds PONV Prevention: NA - Not pediatric patient, not GA or 2 or more risk factors NOT present  PQRS# 424 - Nalini-op Temp Management: 4559F - At least one body temp DOCUMENTED => 35.5C or 95.9F within required timeframe  PQRS# 426 - PACU Transfer Protocol: - Transfer of care checklist used  ASA# 14 - Acute Post-op Pain: ASA14B - Patient did NOT experience pain >= 7 out of 10

## 2021-05-27 NOTE — ANESTHESIA POSTPROCEDURE EVALUATION
Patient: Amy Sands  CYSTOSCOPY, RIGHT URETEROSCOPY STENT INSERTION AND RETROGRADE PYELOGRAM  Anesthesia type: general    Patient location: PACU  Last vitals:   Vitals:    03/25/19 1600   BP: 139/76   Pulse: 76   Resp: 17   Temp:    SpO2: 97%     Post vital signs: stable  Level of consciousness: awake and responds to simple questions  Post-anesthesia pain: pain controlled  Post-anesthesia nausea and vomiting: no  Pulmonary: unassisted, return to baseline  Cardiovascular: stable and blood pressure at baseline  Hydration: adequate  Anesthetic events: no    QCDR Measures:  ASA# 11 - Nalini-op Cardiac Arrest: ASA11B - Patient did NOT experience unanticipated cardiac arrest  ASA# 12 - Nalini-op Mortality Rate: ASA12B - Patient did NOT die  ASA# 13 - PACU Re-Intubation Rate: ASA13B - Patient did NOT require a new airway mgmt  ASA# 10 - Composite Anes Safety: ASA10A - No serious adverse event    Additional Notes:

## 2021-05-28 ENCOUNTER — RECORDS - HEALTHEAST (OUTPATIENT)
Dept: ADMINISTRATIVE | Facility: CLINIC | Age: 61
End: 2021-05-28

## 2021-05-28 NOTE — TELEPHONE ENCOUNTER
Who is calling:  patient  Reason for Call:   Patient dropped paperwork off at the clinic on 4/25/2019 and is wondering how long she will have to wait for this.  Please advise  Date of last appointment with primary care:  3/22/2019  Okay to leave a detailed message: Yes

## 2021-05-30 ENCOUNTER — RECORDS - HEALTHEAST (OUTPATIENT)
Dept: ADMINISTRATIVE | Facility: CLINIC | Age: 61
End: 2021-05-30

## 2021-05-30 NOTE — PROGRESS NOTES
Patient ID: Amy Sands is a 58 y.o. female.  /68   Pulse 71   Wt 198 lb (89.8 kg)   SpO2 98%   BMI 36.21 kg/m      Assessment/Plan:                   Diagnoses and all orders for this visit:    Right calf pain  -     US Venous Leg Right; Future; Expected date: 07/22/2019  -     Cancel: US Ankle Brachial Indices; Future; Expected date: 07/22/2019    Claudication (H)  -     Cancel: US Ankle Brachial Indices; Future; Expected date: 07/22/2019      DISCUSSION  Differential includes: Muscle injury, blood clot, peripheral vascular disease with claudication, nerve impingement.    Most likely etiology would be muscle injury.  Will rule out blood clot by obtaining an ultrasound in a timely manner.  Will then evaluate with ankle-brachial indices to rule out peripheral vascular disease and consider further evaluation pending the results of that test.    Ultrasound revealed no clot, ankle-brachial indices revealed no peripheral vascular disease.  Patient contacted with results will proceed with EMG testing for physical therapy.  Subjective:     HPI    Amy Sands is a 58 y.o. female medical history includes fibromyalgia, rheumatoid arthritis, osteoarthritis leading to knee replacement, rosacea and kidney stones.    She is here today reporting history of right calf pain.  Patient reports pain has been going on for nearly 2 months.  Patient denies any specific injury starting pain.  She does not report any significant swelling or noticeable redness.  Pain is impacting walking and forcing her to walk with a limp.  Pain is located in the area just below the knee in the posterior portion of the leg.  Patient has never had previous similar problem.  She does report on occasion radiation of pain down further into the leg.  She has also noticed some mild tingling sensation but this has not been persistent.  She does not feel any muscular weakness.    Review of Systems  Complete review of systems is obtained.  Other  than the specific considerations noted above complete review of systems is negative.          Objective:   Medications:  Current Outpatient Medications   Medication Sig Note     albuterol (PROAIR HFA) 90 mcg/actuation inhaler Inhale 2 puffs every 6 (six) hours as needed for wheezing.      budesonide-formoterol (SYMBICORT) 160-4.5 mcg/actuation inhaler Inhale 2 puffs 2 (two) times a day.      folic acid (FOLVITE) 1 MG tablet Take 1 mg by mouth. 7/22/2018: Received from: [facility] Received Sig: Take 1 tablet by mouth once daily.     gabapentin (NEURONTIN) 100 MG capsule Take 100 mg by mouth 3 (three) times a day.      methotrexate 2.5 MG tablet Take by mouth once a week.      triamcinolone (KENALOG) 0.1 % cream Apply to affected areas two times daily        Allergies:  Allergies   Allergen Reactions     Dilaudid [Hydromorphone] Nausea And Vomiting     Hydrocodone Nausea And Vomiting     Toradol [Ketorolac] Nausea And Vomiting       Tobacco:   reports that she is a non-smoker but has been exposed to tobacco smoke. She has never used smokeless tobacco.     Physical Exam          /68   Pulse 71   Wt 198 lb (89.8 kg)   SpO2 98%   BMI 36.21 kg/m        General: Patient alert no signs of distress    Heart and lungs regular rate and rhythm no murmur, lungs clear without wheeze crackle or other focal sound    Examination of her leg reveals no bruising redness or swelling.  There is pain to palpation in the posterior musculature in the calf region.  It is nonfocal.  No defect or abnormalities palpated within this area.  I do not distinctly palpate pulsations within the feet but feet are warm with good capillary refill.  Sensation is intact using light touch.  Patient walks with a slight limp favoring the right leg.

## 2021-05-31 ENCOUNTER — RECORDS - HEALTHEAST (OUTPATIENT)
Dept: ADMINISTRATIVE | Facility: CLINIC | Age: 61
End: 2021-05-31

## 2021-05-31 VITALS — BODY MASS INDEX: 34.02 KG/M2 | WEIGHT: 189 LBS

## 2021-05-31 VITALS — WEIGHT: 194.9 LBS | BODY MASS INDEX: 35.08 KG/M2

## 2021-06-01 ENCOUNTER — RECORDS - HEALTHEAST (OUTPATIENT)
Dept: ADMINISTRATIVE | Facility: CLINIC | Age: 61
End: 2021-06-01

## 2021-06-01 VITALS — WEIGHT: 201.5 LBS | BODY MASS INDEX: 37.08 KG/M2 | HEIGHT: 62 IN

## 2021-06-01 VITALS — WEIGHT: 198 LBS | BODY MASS INDEX: 36.21 KG/M2

## 2021-06-02 ENCOUNTER — RECORDS - HEALTHEAST (OUTPATIENT)
Dept: ADMINISTRATIVE | Facility: CLINIC | Age: 61
End: 2021-06-02

## 2021-06-02 VITALS — HEIGHT: 62 IN | BODY MASS INDEX: 35.9 KG/M2 | WEIGHT: 195.11 LBS

## 2021-06-02 VITALS — HEIGHT: 62 IN | WEIGHT: 201.6 LBS | BODY MASS INDEX: 37.1 KG/M2

## 2021-06-02 VITALS — BODY MASS INDEX: 37.35 KG/M2 | WEIGHT: 204.2 LBS

## 2021-06-02 VITALS — BODY MASS INDEX: 37.22 KG/M2 | WEIGHT: 203.5 LBS

## 2021-06-03 VITALS — WEIGHT: 198 LBS | BODY MASS INDEX: 36.21 KG/M2

## 2021-06-03 NOTE — PROGRESS NOTES
Patient ID: Amy Sands is a 59 y.o. female.  /74   Pulse 76   Wt 190 lb (86.2 kg)   BMI 34.75 kg/m      Assessment/Plan:                   Diagnoses and all orders for this visit:    Inflammatory arthritis    Right wrist pain  -     XR Wrist Right 3 or More VWS; Future; Expected date: 11/29/2019  -     predniSONE (DELTASONE) 20 MG tablet; Take 20 mg by mouth daily.  Dispense: 5 tablet; Refill: 0          DISCUSSION  Examination of the x-ray reveals no obvious deformity.  She is prominent ulnar styloid near the area.  There is evidence of soft tissue swelling.  I suspect she has a flareup of underlying inflammatory arthritis.  We will place her on a short course of prednisone 20 mg once daily for 5 days.  Patient will continue all other medications as prior.  Based on her clinical course with this intervention we will decide on further course of action.  Subjective:     HPI    Amy Sands is a 59 y.o. female her medical history includes fibromyalgia, inflammatory arthritis which is on methotrexate under the direction of rheumatologist, osteoarthritis, rosacea and kidney stones.    She is here today concerned regarding 1 week history of right wrist pain.  Patient denies any specific injury.  She notes rather significant pain with limited range of motion and swelling in the right wrist.  The swelling seems to be just proximal to the wrist joint itself.  There is no redness.  She cannot move the wrist.  She does report some slight sensation of tingling.  She has no other joints that are red swollen or painful.  She otherwise feels well denies fevers chills night sweats abdominal pain nausea vomiting and diarrhea.      Review of Systems  Complete review of systems is obtained.  Other than the specific considerations noted above complete review of systems is negative.        Objective:   Medications:  Current Outpatient Medications   Medication Sig Note     albuterol (PROAIR HFA) 90 mcg/actuation inhaler  Inhale 2 puffs every 6 (six) hours as needed for wheezing.      budesonide-formoterol (SYMBICORT) 160-4.5 mcg/actuation inhaler Inhale 2 puffs 2 (two) times a day.      folic acid (FOLVITE) 1 MG tablet Take 1 mg by mouth. 7/22/2018: Received from: [facility] Received Sig: Take 1 tablet by mouth once daily.     gabapentin (NEURONTIN) 100 MG capsule Take 100 mg by mouth 3 (three) times a day.      methotrexate 2.5 MG tablet Take by mouth once a week.      triamcinolone (KENALOG) 0.1 % cream Apply to affected areas two times daily      predniSONE (DELTASONE) 20 MG tablet Take 20 mg by mouth daily.      Allergies:  Allergies   Allergen Reactions     Dilaudid [Hydromorphone] Nausea And Vomiting     Hydrocodone Nausea And Vomiting     Toradol [Ketorolac] Nausea And Vomiting     Tobacco:   reports that she is a non-smoker but has been exposed to tobacco smoke. She has never used smokeless tobacco.     Physical Exam      /74   Pulse 76   Wt 190 lb (86.2 kg)   BMI 34.75 kg/m        General: Patient alert no signs of distress    Examination of her wrist reveals focal soft tissue swelling near the ulnar styloid on the right wrist.  There is no associated redness.  The area is exquisitely tender.  She is limited range of motion with flexion extension of the wrist.  She is able to flex and extend the fingers with minimal difficulty.    Plain film was obtained.  Examination plain film reveals no obvious bony deformity.  There is evidence of soft tissue swelling.  She has a prominent ulnar styloid.

## 2021-06-04 VITALS
DIASTOLIC BLOOD PRESSURE: 80 MMHG | WEIGHT: 193 LBS | BODY MASS INDEX: 35.51 KG/M2 | TEMPERATURE: 98.4 F | SYSTOLIC BLOOD PRESSURE: 124 MMHG | HEART RATE: 73 BPM | OXYGEN SATURATION: 94 % | HEIGHT: 62 IN

## 2021-06-04 VITALS
RESPIRATION RATE: 21 BRPM | OXYGEN SATURATION: 92 % | HEART RATE: 80 BPM | SYSTOLIC BLOOD PRESSURE: 128 MMHG | WEIGHT: 193 LBS | BODY MASS INDEX: 35.3 KG/M2 | TEMPERATURE: 98.8 F | DIASTOLIC BLOOD PRESSURE: 82 MMHG

## 2021-06-04 VITALS
WEIGHT: 196 LBS | HEART RATE: 70 BPM | SYSTOLIC BLOOD PRESSURE: 128 MMHG | BODY MASS INDEX: 35.85 KG/M2 | OXYGEN SATURATION: 99 % | TEMPERATURE: 98.6 F | DIASTOLIC BLOOD PRESSURE: 76 MMHG

## 2021-06-04 VITALS
HEART RATE: 82 BPM | WEIGHT: 193.06 LBS | DIASTOLIC BLOOD PRESSURE: 72 MMHG | TEMPERATURE: 98.1 F | SYSTOLIC BLOOD PRESSURE: 127 MMHG | HEIGHT: 61 IN | BODY MASS INDEX: 36.45 KG/M2 | OXYGEN SATURATION: 95 %

## 2021-06-04 VITALS
DIASTOLIC BLOOD PRESSURE: 74 MMHG | SYSTOLIC BLOOD PRESSURE: 122 MMHG | HEART RATE: 76 BPM | BODY MASS INDEX: 34.75 KG/M2 | WEIGHT: 190 LBS

## 2021-06-04 NOTE — PROGRESS NOTES
Assessment and Plan:     1. Pneumonia of left upper lobe due to infectious organism (H)     2. Chronic Bronchitis With Acute Exacerbation     3. Rheumatoid arthritis, involving unspecified site, unspecified rheumatoid factor presence (H)       Patient continues doxycycline and prednisone as prescribed.  She will continue to use Symbicort daily and albuterol as needed.  She is found improvement of symptoms.  She will follow-up with Dr. Acuna in 1 month for repeat chest x-ray or sooner if symptoms persist or worsen.  She is content with the plan.    Subjective:     Amy is a 59 y.o. female presenting to the clinic for follow-up on urgent care evaluation.  Patient has multiple comorbidities including fibromyalgia, rheumatoid arthritis, chronic bronchitis.  Patient had been experiencing cold symptoms for 7 days.  Patient had a fever of 102.6.  She presented to walk-in clinic on 12/17/2019 where she was diagnosed with pneumonia.  She was treated with doxycycline and prednisone.  Patient states her symptoms have improved.  She continues to experience a productive cough and postnasal drainage.  She denies sinus congestion, headache, stomachache, nausea, vomiting.  She has had some wheezing.  She uses Symbicort daily and is using albuterol every 4 hours.  Patient has tried over-the-counter Mucinex, DayQuil, and NyQuil.  Her  had cold symptoms previously.  Patient does have rheumatoid arthritis and sees rheumatology through [facility].  She is receiving methotrexate.    Review of Systems: A complete 14 point review of systems was obtained and is negative or as stated in the history of present illness.    Social History     Socioeconomic History     Marital status:      Spouse name: Not on file     Number of children: Not on file     Years of education: Not on file     Highest education level: Not on file   Occupational History     Occupation: HHA   Social Needs     Financial resource strain: Not on file     Food  insecurity:     Worry: Not on file     Inability: Not on file     Transportation needs:     Medical: Not on file     Non-medical: Not on file   Tobacco Use     Smoking status: Passive Smoke Exposure - Never Smoker     Smokeless tobacco: Never Used   Substance and Sexual Activity     Alcohol use: No     Drug use: No     Sexual activity: Not on file   Lifestyle     Physical activity:     Days per week: Not on file     Minutes per session: Not on file     Stress: Not on file   Relationships     Social connections:     Talks on phone: Not on file     Gets together: Not on file     Attends Jain service: Not on file     Active member of club or organization: Not on file     Attends meetings of clubs or organizations: Not on file     Relationship status: Not on file     Intimate partner violence:     Fear of current or ex partner: Not on file     Emotionally abused: Not on file     Physically abused: Not on file     Forced sexual activity: Not on file   Other Topics Concern     Not on file   Social History Narrative     Not on file       Active Ambulatory Problems     Diagnosis Date Noted     Right Rotator Cuff Tendonitis      Fibromyalgia      Strained Right Pectoralis Major Muscle      Abdominal Pain      Benign Pigmented Nevus      Rosacea      Lentigo      Chronic Bronchitis With Acute Exacerbation      Chest Pain      Joint Pain, Localized In The Knee      Primary osteoarthritis of left knee 01/07/2016     Total knee replacement status 01/09/2016     Productive cough 06/14/2018     TB lung, latent 06/14/2018     Visit for screening mammogram 06/14/2018     Pneumonia 06/14/2018     Pre-op exam 01/11/2019     Acute torn meniscus of knee, right, sequela 01/11/2019     Urinary urgency 03/20/2019     Right flank discomfort 03/20/2019     Hydronephrosis with urinary obstruction due to ureteral calculus      Resolved Ambulatory Problems     Diagnosis Date Noted     No Resolved Ambulatory Problems     Past Medical  "History:   Diagnosis Date     Arthritis      Chronic bronchitis (H)      History of anesthesia complications      Kidney stone      PONV (postoperative nausea and vomiting)      Rotator cuff tendonitis, right      Sleep apnea        Family History   Problem Relation Age of Onset     Cancer Mother      Cancer Father      Clotting disorder Sister      Cancer Sister      Kidney disease Brother      Prostate cancer Brother      Cancer Brother      Urolithiasis Neg Hx      Diabetes Neg Hx      Gout Neg Hx      Heart disease Neg Hx        Objective:     /80   Pulse 73   Temp 98.4  F (36.9  C) (Oral)   Ht 5' 2\" (1.575 m)   Wt 193 lb (87.5 kg)   SpO2 94%   BMI 35.30 kg/m      Patient is alert, in no obvious distress.   Skin: Warm, dry.  No lesions or rashes.  Skin turgor rapid return.   HEENT:  Head normocephalic, atraumatic.  Eyes normal. Ears normal.  Nose patent, mucosa red.  Oropharynx mucosa pink.  No lesions or tonsillar enlargement.   Neck: Supple, no lymphadenopathy.   Lungs:  Clear to auscultation. Respirations even and unlabored.  No wheezing or rales noted.   Heart:  Regular rate and rhythm.  No murmurs.              "

## 2021-06-05 NOTE — PROGRESS NOTES
Patient ID: Amy Sands is a 59 y.o. female.  /76   Pulse 70   Temp 98.6  F (37  C)   Wt 196 lb (88.9 kg)   SpO2 99%   BMI 35.85 kg/m      Assessment/Plan:                Diagnoses and all orders for this visit:    Pneumonia of left upper lobe due to infectious organism (H)  -     XR Chest 2 Views; Future; Expected date: 01/20/2020    Chronic Bronchitis With Acute Exacerbation    Right wrist pain      DISCUSSION  Patient will return to clinic later today as x-ray unavailable in the early hours of her visit.  Clinically speaking she has improved back to her normal state.  No indication for further work-up or treatment based on her clinical state.  We will compare x-ray to previous and determine if any further follow-up is needed once the x-ray is obtained later today.    Patient will follow-up with orthopedic specialty provider for her wrist as mentioned.  Subjective:     HPI    Amy Sands is a 59 y.o. female she is here today to follow-up on pneumonia.  Patient was seen in mid December in walk-in clinic.  She had significant respiratory infection with exacerbation of underlying chronic bronchitis.  Chest x-ray showed left-sided findings consistent with.  She was placed on a course of antibiotic, prednisone and encouraged to use nebulizer and inhalers.  Patient reports that initiation of medication therapy began to help fairly quickly she was seen here within 2 days for a recheck and she was thought to be doing reasonably well but follow-up was recommended at this point primarily to recheck chest x-ray.  Patient reports it took approximately a 2-week time duration before she felt close to normal.  At this time she has returned to her usual use of inhaler medication and is no longer having to use nebulizer or rescue inhaler with any frequency.  She denies cough, fever, chills, shortness of breath, chest pain, dizziness, lightheadedness and syncope.    She does have underlying rheumatoid arthritis  and fibromyalgia.  She follows with rheumatology.  I had seen her in late November with some unusual significant wrist pain.  We engaged in work-up here and no obvious cause was determined.  We placed her on a short course of prednisone thinking that this was likely to be a flareup of her inflammatory arthritis.  Prednisone resulted in minimal benefit.  Last week she saw her rheumatologist.  Her methotrexate dose was increased.  Still remains uncertainty as to the exact cause of this pain.  She has been referred by her rheumatologist to see orthopedic specialty provider.  Patient reports pain is tolerable and there are no new concerns at this time.    Review of Systems  Complete review of systems is obtained.  Other than the specific considerations noted above complete review of systems is negative.      Objective:   Medications:  Current Outpatient Medications   Medication Sig Note     albuterol (PROAIR HFA) 90 mcg/actuation inhaler Inhale 2 puffs every 6 (six) hours as needed for wheezing.      budesonide-formoterol (SYMBICORT) 160-4.5 mcg/actuation inhaler Inhale 2 puffs 2 (two) times a day.      folic acid (FOLVITE) 1 MG tablet Take 1 mg by mouth. 7/22/2018: [facility] [facility] Received Sig: Take 1 tablet by mouth once daily.     gabapentin (NEURONTIN) 100 MG capsule Take 100 mg by mouth 3 (three) times a day.      methotrexate 2.5 MG tablet Take by mouth once a week.      pregabalin (LYRICA) 75 MG capsule Take 75 mg by mouth.      triamcinolone (KENALOG) 0.1 % cream Apply to affected areas two times daily      Allergies:  Allergies   Allergen Reactions     Dilaudid [Hydromorphone] Nausea And Vomiting     Hydrocodone Nausea And Vomiting     Toradol [Ketorolac] Nausea And Vomiting     Tobacco:   reports that she is a non-smoker but has been exposed to tobacco smoke. She has never used smokeless tobacco.     Physical Exam      /76   Pulse 70   Temp 98.6  F (37  C)    Wt 196 lb (88.9 kg)   SpO2 99%   BMI 35.85 kg/m      General Appearance:    Alert, cooperative, no distress   Eyes:   No scleral icterus or conjunctival irritation       Lungs:     Clear to auscultation bilaterally, respirations unlabored, no wheezes or crackles   Heart:    Regular rate and rhythm,  No murmur   Extremities:  Focal swelling is noted around the right wrist there is no redness.  Patient can move her wrist with only minimal discomfort   Skin:  No concerning skin findings, no suspicious moles, no rashes   Neurologic:  On gross examination there is no motor or sensory deficit.  Patient walks with a normal gait

## 2021-06-08 NOTE — PROGRESS NOTES
Preoperative Exam    Scheduled Procedure: right total knee replacement  Surgery Date:  6/15/2020  Surgery Location: [facility]  [facility] [phone]    Surgeon:  DR Sandoval    Assessment/Plan:     Amy was seen today for pre-op exam.    Pre-operative examination  -     Electrocardiogram Perform and Read  -     HM2(CBC w/o Differential)  -     Basic Metabolic Panel  -No contraindication to planned procedure.  Underlying risk factors include history of mild sleep apnea.  She does not use a CPAP machine.  She she also has history of chronic bronchitis but symptoms are well controlled on current inhaler regimen.  Rheumatoid arthritis is stable.  She is holding methotrexate prior to surgery.  There is family history of DVT and PE.  Postop anticoagulation to be determined by surgeon.  She has COVID-19 testing scheduled.    Osteoarthritis of right knee, unspecified osteoarthritis type  Okay to proceed with planned procedure    Chronic bronchitis  No signs of exacerbation    Sleep apnea, unspecified type  Mild.  Does not use CPAP machine    Rheumatoid arthritis, involving unspecified site, unspecified rheumatoid factor presence (H)  Is holding methotrexate prior to surgery        Surgical Procedure Risk: Vascular/High (reported cardiac risk often > 5%)  Have you had prior anesthesia?: Yes  Have you or any family members had a previous anesthesia reaction:  Yes: vomits, hard to wake up   Do you or any family members have a history of a clotting or bleeding disorder?: brother had several PE's, sister had DVT   Cardiac Risk Assessment: no increased risk for major cardiac complications    APPROVAL GIVEN to proceed with proposed procedure, without further diagnostic evaluation    Please Note:  Patient has history of mild sleep apnea but does not use a CPAP machine    Functional Status: Independent  Patient plans to recover at home with family.     Subjective:      Amy Sands is a 59 y.o. female who presents for a  preoperative consultation.  She has end-stage grade 4 primary osteoarthritis of the right knee.  She has tried and failed all conservative management.  It limits her ability to walk and perform activities of daily living as well as perform job duties.  She is scheduled for right total knee arthroplasty.  She also has history of rheumatoid arthritis, fibromyalgia, chronic bronchitis and mild sleep apnea.  We reviewed and updated her medications and allergies.  She is currently feeling otherwise healthy.  She does not use CPAP machine as her sleep apnea is mild.  She is holding methotrexate prior to surgery as recommended by her orthopedic surgeon.  She has COVID-19 testing scheduled.  She is not experiencing any current symptoms of illness.  Denies fevers and chills, headaches, cough, chest pain/pressure, shortness of breath.  No nausea/vomiting.  No diarrhea.  No UTI symptoms.  Review of systems is otherwise negative.  No other concerns or questions today.    All other systems reviewed and are negative, other than those listed in the HPI.    Pertinent History  Do you have difficulty breathing or chest pain after walking up a flight of stairs: No  History of obstructive sleep apnea: mild--does not use CPAP  Steroid use in the last 6 months: Yes: cortisone and synvisc injections right knee   Frequent Aspirin/NSAID use: No  Prior Blood Transfusion: No  Prior Blood Transfusion Reaction: No  If for some reason prior to, during or after the procedure, if it is medically indicated, would you be willing to have a blood transfusion?:  There is no transfusion refusal.    Current Outpatient Medications   Medication Sig Dispense Refill     budesonide-formoterol (SYMBICORT) 160-4.5 mcg/actuation inhaler Inhale 2 puffs 2 (two) times a day. 10.2 Inhaler 1     pregabalin (LYRICA) 75 MG capsule Take 75 mg by mouth.       albuterol (PROAIR HFA) 90 mcg/actuation inhaler Inhale 2 puffs every 6 (six) hours as needed for wheezing. 1  Inhaler 1     folic acid (FOLVITE) 1 MG tablet Take 1 mg by mouth.       methotrexate 2.5 MG tablet Take by mouth once a week.       No current facility-administered medications for this visit.         Allergies   Allergen Reactions     Dilaudid [Hydromorphone] Nausea And Vomiting     Hydrocodone Nausea And Vomiting     Toradol [Ketorolac] Nausea And Vomiting       Patient Active Problem List   Diagnosis     Right Rotator Cuff Tendonitis     Fibromyalgia     Strained Right Pectoralis Major Muscle     Benign Pigmented Nevus     Rosacea     Lentigo     Chronic Bronchitis With Acute Exacerbation     Chest Pain     Joint Pain, Localized In The Knee     Primary osteoarthritis of left knee     Total knee replacement status     Productive cough     TB lung, latent     Visit for screening mammogram     Pre-op exam     Urinary urgency     Right flank discomfort     Hydronephrosis with urinary obstruction due to ureteral calculus     Obesity (BMI 35.0-39.9) with comorbidity (H)       Past Medical History:   Diagnosis Date     Arthritis      Chronic bronchitis (H)      Fibromyalgia      History of anesthesia complications     HARD WAKEUP AND LOW BP     Kidney stone      PONV (postoperative nausea and vomiting)     motion sickness     Rosacea      Rotator cuff tendonitis, right      Sleep apnea     DOESNT USE CPAP       Past Surgical History:   Procedure Laterality Date     CARPAL TUNNEL RELEASE Bilateral      CYSTOSCOPY W/ URETERAL STENT PLACEMENT Right 3/25/2019    Procedure: CYSTOSCOPY, RIGHT URETEROSCOPY STENT INSERTION AND RETROGRADE PYELOGRAM;  Surgeon: Kenneth Lopez MD;  Location: [facility] OR;  Service: Urology     HYSTERECTOMY       KNEE ARTHROSCOPY Left      lysis of adhesions for bowel obstruction x 2       KY TOTAL KNEE ARTHROPLASTY Left 1/7/2016    Procedure: TOTAL KNEE  ARTHROPLASTY;  Surgeon: Boubacar Cortez MD;  Location: [facility];  Service: Orthopedics       Social History  "    Socioeconomic History     Marital status:      Spouse name: Not on file     Number of children: Not on file     Years of education: Not on file     Highest education level: Not on file   Occupational History     Occupation: HHA   Social Needs     Financial resource strain: Not on file     Food insecurity     Worry: Not on file     Inability: Not on file     Transportation needs     Medical: Not on file     Non-medical: Not on file   Tobacco Use     Smoking status: Passive Smoke Exposure - Never Smoker     Smokeless tobacco: Never Used   Substance and Sexual Activity     Alcohol use: No     Drug use: No     Sexual activity: Not on file   Lifestyle     Physical activity     Days per week: Not on file     Minutes per session: Not on file     Stress: Not on file   Relationships     Social connections     Talks on phone: Not on file     Gets together: Not on file     Attends Amish service: Not on file     Active member of club or organization: Not on file     Attends meetings of clubs or organizations: Not on file     Relationship status: Not on file     Intimate partner violence     Fear of current or ex partner: Not on file     Emotionally abused: Not on file     Physically abused: Not on file     Forced sexual activity: Not on file   Other Topics Concern     Not on file   Social History Narrative     Not on file       Patient Care Team:  Kenneth Acuna MD as PCP - General  Kenneth Acuna MD as Assigned PCP          Objective:     Vitals:    06/02/20 1255   BP: 127/72   Pulse: 82   Temp: 98.1  F (36.7  C)   TempSrc: Oral   SpO2: 95%   Weight: 193 lb 1 oz (87.6 kg)   Height: 5' 1\" (1.549 m)         Physical Exam:  Physical Exam  Physical Examination: General appearance - alert, well appearing, and in no distress  Mental status - alert, oriented to person, place, and time  Eyes - pupils equal and reactive, extraocular eye movements intact  Ears - bilateral TM's and external ear canals normal  Nose " - normal and patent, no erythema, discharge or polyps  Mouth - mucous membranes moist, pharynx normal without lesions  Neck - supple, no significant adenopathy  Chest - clear to auscultation, no wheezes, rales or rhonchi, symmetric air entry  Heart - normal rate, regular rhythm, normal S1, S2, no murmurs, rubs, clicks or gallops  Abdomen - soft, nontender, nondistended, no masses or organomegaly  Neurological - alert, oriented, normal speech, no focal findings or movement disorder noted  Musculoskeletal - no joint tenderness, deformity or swelling  Extremities - peripheral pulses normal, no pedal edema, no clubbing or cyanosis  Skin - normal coloration and turgor, no rashes, no suspicious skin lesions noted      There are no Patient Instructions on file for this visit.    EKG: EKG was performed in clinic.  This was personally reviewed.  Per my read it showed normal sinus rhythm and was a normal EKG    Labs:  Labs pending at this time.  Results will be reviewed when available.    Immunization History   Administered Date(s) Administered     Td, Adult, Absorbed 1959     Td,adult,historic,unspecified 1960     Tdap 12/22/2015           Electronically signed by Jessica Carreon MD 06/02/20 12:35 PM

## 2021-06-12 NOTE — PROGRESS NOTES
Chief Complaint   Patient presents with     Skin Problem     Notice Aug 14. Back and neck. Sister had staph. infection, , a few days before.        HPI    Patient is here for painful rash on neck since , worsening, with mild to moderate pain, without itching. Her sister recently had staph skin infection. Otherwise, no other recent unusual contacts nor exposures. No fever, chills.     ROS: Pertinent ROS noted in HPI.     Allergies   Allergen Reactions     Hydrocodone Nausea And Vomiting       Patient Active Problem List   Diagnosis     Right Rotator Cuff Tendonitis     Fibromyalgia     Strained Right Pectoralis Major Muscle     Abdominal Pain     Benign Pigmented Nevus     Rosacea     Lentigo     Chronic Bronchitis With Acute Exacerbation     Chest Pain     Joint Pain, Localized In The Knee     Primary osteoarthritis of left knee     Total knee replacement status       No family history on file.    Social History     Social History     Marital status:      Spouse name: N/A     Number of children: N/A     Years of education: N/A     Occupational History     Not on file.     Social History Main Topics     Smoking status: Passive Smoke Exposure - Never Smoker     Smokeless tobacco: Not on file     Alcohol use No     Drug use: No     Sexual activity: Not on file     Other Topics Concern     Not on file     Social History Narrative         Objective:    Vitals:    17 0940   BP: 100/72   Pulse: 72   Resp: 12   Temp: 98.1  F (36.7  C)   SpO2: 96%       Gen:NAD  Skin: erythematous eruptions on left and right neck with some indurated, raised lesions on left and right neck, and a few on upper back. There are also two excoriated, scaly erythematous lesions on right forehead. No pustules nor vesicles currently. The rest of body is clear.      Rash/skin eruption  -     cephalexin (KEFLEX) 500 MG capsule; Take 1 capsule (500 mg total) by mouth 4 (four) times a day for 10 days.  -     triamcinolone (KENALOG)  0.1 % cream; Apply to affected areas two times daily      Discussed etiologies including shingles (although her lesions are bilateral) vs contact dermatitis vs impetigo. Since lesions have appeared since 8/14, and present in both sides of neck, will not treat for shingles. Advised close follow up if no improvement in 3 days.

## 2021-06-15 ENCOUNTER — OFFICE VISIT - HEALTHEAST (OUTPATIENT)
Dept: FAMILY MEDICINE | Facility: CLINIC | Age: 61
End: 2021-06-15

## 2021-06-15 DIAGNOSIS — R05.9 COUGH: ICD-10-CM

## 2021-06-15 DIAGNOSIS — J44.1 OBSTRUCTIVE CHRONIC BRONCHITIS WITH EXACERBATION (H): ICD-10-CM

## 2021-06-15 RX ORDER — PREDNISONE 20 MG/1
20 TABLET ORAL DAILY
Qty: 7 TABLET | Refills: 0 | Status: SHIPPED | OUTPATIENT
Start: 2021-06-15 | End: 2021-06-22

## 2021-06-15 RX ORDER — TIOTROPIUM BROMIDE 18 UG/1
18 CAPSULE ORAL; RESPIRATORY (INHALATION) DAILY
Qty: 30 CAPSULE | Refills: 2 | Status: SHIPPED | OUTPATIENT
Start: 2021-06-15 | End: 2022-11-11

## 2021-06-15 NOTE — PROGRESS NOTES
Patient ID: Amy Sands is a 57 y.o. female.  /78  Pulse 73  Wt 194 lb 14.4 oz (88.4 kg)  BMI 35.08 kg/m2    Assessment/Plan:                Diagnoses and all orders for this visit:    Trochanteric bursitis, right hip  -     lidocaine (PF) 10 mg/mL (1 %) injection 3 mL (XYLOCAINE-MPF); Inject 3 mL into the joint once.  -     methylPREDNISolone acetate injection 40 mg (DEPO-MEDROL); Inject 1 mL (40 mg total) into the joint once.  -     Ambulatory referral to PT/OT    Right hip pain  -     XR Pelvis W 2 Vw Hip Right; Future; Expected date: 1/12/18    Osteoarthritis of right hip    Other orders  -     Cancel: Mammo Screening Bilateral; Future; Expected date: 1/12/18  -     pregabalin (LYRICA) 75 MG capsule; Take 1 capsule (75 mg total) by mouth 2 (two) times a day.  Dispense: 180 capsule; Refill: 1      DISCUSSION  Examination findings are most consistent with trochanteric bursitis.  On x-ray there is an element of osteoarthritis but this is less likely to be causing the significant pain based on the exam.  Corticosteroid injection is performed as noted in the procedure note below.  Patient tolerated the procedure well.  We will arrange for physical therapy.  She will notify me immediately if there are any complications.  Subjective:     HPI    Amy Sands is a 57 y.o. female who is here today concerned regarding right hip pain.  Right hip pain is been going on for several weeks.  No history of injury.  Patient had a left knee replacement and feels that since her knee replacement which is going well she has probably inadvertently put more strain on her right hip.  She has had previous bouts of pain that is been much less significant and shorter duration in her hip region.  She feels it is very tender to the touch on the right side right over the point of the hip.  She also notes she cannot lie on that side without pain.  She denies any numbness or tingling or other neurologic symptoms.  No skin change  overlying the area of tenderness.    Review of Systems  Complete review of systems is obtained.  Other than the specific considerations noted above complete review of systems is negative.          Objective:   Medications:  Current Outpatient Prescriptions   Medication Sig Note     albuterol (PROAIR HFA) 90 mcg/actuation inhaler Inhale 2 puffs every 6 (six) hours as needed for wheezing.      pregabalin (LYRICA) 75 MG capsule Take 1 capsule (75 mg total) by mouth 2 (two) times a day.      SYMBICORT 160-4.5 mcg/actuation inhaler INHALE TWO PUFFS BY MOUTH TWICE DAILY      triamcinolone (KENALOG) 0.1 % cream Apply to affected areas two times daily      amoxicillin (AMOXIL) 500 MG capsule TAKE 4 CAPSULES BY MOUTH 1 HOUR PRIOR TO EVERY DENTAL APPOINTMENT 11/7/2016: Received from: External Pharmacy     ciprofloxacin HCl (CIPRO) 500 MG tablet Take 1 tablet (500 mg total) by mouth 2 (two) times a day.      LYRICA 75 mg capsule TAKE ONE CAPSULE BY MOUTH TWICE DAILY      pregabalin (LYRICA) 75 MG capsule Take 1 capsule (75 mg total) by mouth 2 (two) times a day.        Allergies:  Allergies   Allergen Reactions     Hydrocodone Nausea And Vomiting       Tobacco:   reports that she is a non-smoker but has been exposed to tobacco smoke. She has never used smokeless tobacco.     Physical Exam          /78  Pulse 73  Wt 194 lb 14.4 oz (88.4 kg)  BMI 35.08 kg/m2    General: Patient is alert no signs of distress    Examination of the hip: There is tenderness over the trochanter on the right hip.  It is rather exquisite tenderness.  There is no overlying skin change such as rest bruising there are no bony deformities that are palpable.  She has limited range of motion causing pain primarily in the outer portion of the hip near the area of tenderness described.    A plain film was obtained of the hip which shows osteoarthritis changes bilaterally with right being greater than the left.  The degree of changes more mild to  moderate.    Right hip trochanteric bursitis injection procedure Note     Amy Sands is a 57 y.o. female is here today for injection of right hip trochanteric bursitis     Indications:   Pain symptoms consistent with trochanteric bursitis have been refractory to conservative treatment done at home thus far.  No contraindication.    Procedure Details   In examination room to the nature the procedure is discussed in great detail including risks benefits and potential complications both short and long-term.    With patient in the left lateral decubitus position the hip is first cleansed with iodine.  Then using a 5 cc syringe containing 3 cc of 1% lidocaine without epinephrine and 1 cc of Depo-Medrol 40 mg/mL the injection is performed using a 22-gauge 1-1/2 inch needle.  The needle is placed in the area of maximal tenderness over the trochanter.  The needle is pushed down to the trochanter where the injection is performed.  Patient tolerated this well.  No significant bleeding or other sensation following the injection    Findings:  None significant    Specimens: See orders    Complications:   None    Plan:   Monitor closely for any palpitations which are discussed as stated.  If there are concerns or complication contact me for advice or seek appropriate medical care.  We will arrange for physical therapy for further treatment.      Kenneth Acuna

## 2021-06-16 PROBLEM — Z22.7 TB LUNG, LATENT: Status: ACTIVE | Noted: 2018-06-14

## 2021-06-16 PROBLEM — Z12.31 VISIT FOR SCREENING MAMMOGRAM: Status: ACTIVE | Noted: 2018-06-14

## 2021-06-16 PROBLEM — E66.01 MORBID OBESITY (H): Status: ACTIVE | Noted: 2020-06-02

## 2021-06-16 PROBLEM — Z01.818 PRE-OP EXAM: Status: ACTIVE | Noted: 2019-01-11

## 2021-06-16 PROBLEM — R10.9 RIGHT FLANK DISCOMFORT: Status: ACTIVE | Noted: 2019-03-20

## 2021-06-16 PROBLEM — R05.8 PRODUCTIVE COUGH: Status: ACTIVE | Noted: 2018-06-14

## 2021-06-16 PROBLEM — R39.15 URINARY URGENCY: Status: ACTIVE | Noted: 2019-03-20

## 2021-06-17 NOTE — PATIENT INSTRUCTIONS - HE
Patient Instructions by Sarah Geiger CNP at 12/17/2019  7:10 AM     Author: Sarah Geiger CNP Service: -- Author Type: Nurse Practitioner    Filed: 12/17/2019  8:57 AM Encounter Date: 12/17/2019 Status: Addendum    : Sarah Geiger CNP (Nurse Practitioner)    Related Notes: Original Note by Sarah Geiger CNP (Nurse Practitioner) filed at 12/17/2019  8:57 AM       You have a left upper lobe pneumonia.      Recheck in 3-4 days.     Prednisone    Use your albuterol inhaler 2 puffs 4 times daily.      Mucinex to help cough up phlegm.      Nyquil at night with sleep.        Patient Education     Pneumonia (Adult)  Pneumonia is an infection deep within the lungs. It is in the small air sacs (alveoli). Pneumonia may be caused by a virus or bacteria. Pneumonia caused by bacteria is usually treated with an antibiotic. Severe cases may need to be treated in the hospital. Milder cases can be treated at home. Symptoms usually start to get better during the first 2 days of treatment.    Home care  Follow these guidelines when caring for yourself at home:    Rest at home for the first 2 to 3 days, or until you feel stronger. Dont let yourself get overly tired when you go back to your activities.    Stay away from cigarette smoke - yours or other peoples.    You may use acetaminophen or ibuprofen to control fever or pain, unless another medicine was prescribed. If you have chronic liver or kidney disease, talk with your healthcare provider before using these medicines. Also talk with your provider if youve had a stomach ulcer or gastrointestinal bleeding. Dont give aspirin to anyone younger than 18 years of age who is ill with a fever. It may cause severe liver damage.    Your appetite may be poor, so a light diet is fine.    Drink 6 to 8 glasses of fluids every day to make sure you are getting enough fluids. Beverages can include water, sport drinks, sodas without caffeine, juices, tea, or soup. Fluids will  help loosen secretions in the lung. This will make it easier for you to cough up the phlegm (sputum). If you also have heart or kidney disease, check with your healthcare provider before you drink extra fluids.    Take antibiotic medicine prescribed until it is all gone, even if you are feeling better after a few days.  Follow-up care  Follow up with your healthcare provider in the next 2 to 3 days, or as advised. This is to be sure the medicine is helping you get better.  If you are 65 or older, you should get a pneumococcal vaccine and a yearly flu (influenza) shot. You should also get these vaccines if you have chronic lung disease like asthma, emphysema, or COPD. Recently, a second type of pneumonia vaccine has become available for everyone over 65 years old. This is in addition to the previous vaccine. Ask your provider about this.  When to seek medical advice  Call your healthcare provider right away if any of these occur:    You dont get better within the first 48 hours of treatment    Shortness of breath gets worse    Rapid breathing (more than 25 breaths per minute)    Coughing up blood    Chest pain gets worse with breathing    Fever of 100.4 F (38 C) or higher that doesnt get better with fever medicine    Weakness, dizziness, or fainting that gets worse    Thirst or dry mouth that gets worse    Sinus pain, headache, or a stiff neck    Chest pain not caused by coughing  Date Last Reviewed: 1/1/2017 2000-2017 [employer]. [address] [place] [id number]. All rights reserved. This information is not intended as a substitute for professional medical care. Always follow your healthcare professional's instructions.

## 2021-06-18 NOTE — PROGRESS NOTES
Assessment/Plan:    1. Pneumonia  I suspect patient could be exhibiting the beginning signs of pneumonia.  Chest x-ray is not entirely indicative of this however given patient's length and severity of symptoms feel it is reasonable to treat.  Prescription sent for azithromycin.  Patient instructed to use this for 5 days.  She may continue use of albuterol and nebulizer treatment as needed for cough/shortness of breath.  Encouraged adequate rest and hydration.  Discussed red flag symptoms that would trigger prompt return to clinic.  She understands and is comfortable with this plan.  - XR Chest 2 Views  - azithromycin (ZITHROMAX Z-BETO) 250 MG tablet; Take 2 tablets (500 mg) on  Day 1,  followed by 1 tablet (250 mg) once daily on Days 2 through 5.  Dispense: 6 tablet; Refill: 0    2. Chronic Bronchitis With Acute Exacerbation  Continue with Symbicort 2 puffs twice daily for management of chronic bronchitis.  Use albuterol as needed for break through symptoms.  - budesonide-formoterol (SYMBICORT) 160-4.5 mcg/actuation inhaler; Inhale 2 puffs 2 (two) times a day.  Dispense: 10.2 Inhaler; Refill: 1    3. Visit for screening mammogram  Patient due for routine mammogram screening.  Referral placed today.  - Mammo Screening Bilateral; Future      Subjective:    Amy Sands is a 57 year old female seen today for evaluation of a cough and chest congestion for 3 weeks.  History of chronic bronchitis.  She uses Symbicort daily and has albuterol to use as needed for this.  Patient came down with cold symptoms about 3 weeks ago.  She thought symptoms would resolve on their own however have only become worse over the last 3 weeks.  If symptoms exacerbate whenever she comes down with illness.  Typically coughs up whitish mucus at baseline.  She has noticed that over the last few days it has become more yellow.  She is feeling very fatigued.  Had fevers at that onset of her illness, up to 101.  Fevers have since subsided.   Symptoms are mostly in her chest.  Feels chest tightness and heaviness along with a productive cough.  She has had some nasal congestion recently.  Cough is waking her up at night.  She has tried taking Mucinex and Robitussin to help with this however symptoms have persisted.  She denies having any chest pain or difficulty breathing.  Does have some shortness of breath with exertion at baseline. Patient was recently diagnosed with latent TB.  She is currently taking isoniazid for this.  Hepatic profiles are being drawn monthly for medication monitoring.  No additional concerns today. Review of systems is as stated in HPI, and the remainder of the 10 system review is otherwise unremarkable.    Past Medical History, Family History, and Social History reviewed.    Past Surgical History:   Procedure Laterality Date     CARPAL TUNNEL RELEASE Bilateral      HYSTERECTOMY       KNEE ARTHROSCOPY Left      lysis of adhesions for bowel obstruction x 2       MA TOTAL KNEE ARTHROPLASTY Left 1/7/2016    Procedure: TOTAL KNEE  ARTHROPLASTY;  Surgeon: Boubacar Cortez MD;  Location: [facility];  Service: Orthopedics        No family history on file.     Past Medical History:   Diagnosis Date     Arthritis      Chronic bronchitis (H)      Fibromyalgia      History of anesthesia complications     HARD WAKEUP AND LOW BP     PONV (postoperative nausea and vomiting)     motion sickness     Rosacea      Rotator cuff tendonitis, right      Sleep apnea     DOESNT USE CPAP        Social History   Substance Use Topics     Smoking status: Passive Smoke Exposure - Never Smoker     Smokeless tobacco: Never Used     Alcohol use No        Current Outpatient Prescriptions   Medication Sig Dispense Refill     albuterol (PROAIR HFA) 90 mcg/actuation inhaler Inhale 2 puffs every 6 (six) hours as needed for wheezing. 1 Inhaler 1     budesonide-formoterol (SYMBICORT) 160-4.5 mcg/actuation inhaler Inhale 2 puffs 2 (two) times a day. 10.2  "Inhaler 1     isoniazid (NYDRAZID) 300 MG tablet Take 1 tablet (300 mg total) by mouth daily. 30 tablet 8     LYRICA 75 mg capsule TAKE ONE CAPSULE BY MOUTH TWICE DAILY 60 capsule 0     methotrexate 2.5 MG tablet Take by mouth once a week.       pregabalin (LYRICA) 75 MG capsule Take 1 capsule (75 mg total) by mouth 2 (two) times a day. 60 capsule 1     pregabalin (LYRICA) 75 MG capsule Take 1 capsule (75 mg total) by mouth 2 (two) times a day. 180 capsule 1     triamcinolone (KENALOG) 0.1 % cream Apply to affected areas two times daily 30 g 0     amoxicillin (AMOXIL) 500 MG capsule TAKE 4 CAPSULES BY MOUTH 1 HOUR PRIOR TO EVERY DENTAL APPOINTMENT  2     azithromycin (ZITHROMAX Z-BETO) 250 MG tablet Take 2 tablets (500 mg) on  Day 1,  followed by 1 tablet (250 mg) once daily on Days 2 through 5. 6 tablet 0     ciprofloxacin HCl (CIPRO) 500 MG tablet Take 1 tablet (500 mg total) by mouth 2 (two) times a day. 20 tablet 0     No current facility-administered medications for this visit.           Objective:    Vitals:    06/14/18 1346   BP: 110/62   Patient Site: Right Arm   Patient Position: Sitting   Cuff Size: Adult Regular   Pulse: 74   Temp: 98  F (36.7  C)   SpO2: 96%   Weight: 201 lb 8 oz (91.4 kg)   Height: 5' 2\" (1.575 m)      Body mass index is 36.85 kg/(m^2).      General Appearance:  Alert, nontoxic, cooperative, no distress, appears stated age   HEENT:  Normal.  No acute findings.   Neck: Supple, symmetrical, no adenopathy.   Lungs:    Harsh rhonchi noted in upper lobes. Respirations unlabored.  No expiratory wheeze or inspiratory crackles noted.   Heart:  Regular rate and rhythm, S1, S2 normal, no murmur, rub or gallop   Skin: Warm, dry.  Skin color, texture, turgor normal, no rashes or lesions         This note has been dictated using voice recognition software. Any grammatical or context distortions are unintentional and inherent to the use of this software.     "

## 2021-06-19 NOTE — PROGRESS NOTES
[facility]     SUBJECTIVE: Amy Sands is a 57 y.o. female who presents today with a sore throat.    She says her symptoms started about 4-5 days ago and have worsened since then.  She says her throat is quite sore and it is very painful to swallow.  She has had no other symptoms including runny nose, fevers, or sinus pain.  She does have a chronic cough due to bronchitis that has not been worse lately.  She does have a history of rheumatoid arthritis diagnosed earlier this year and is on methotrexate.  She was also diagnosed with latent tuberculosis and has been on INH since April.  She has tried Cepacol lozenges and ibuprofen without relief.     ROS:   - As above     PMH:   Past Medical History:   Diagnosis Date     Arthritis      Chronic bronchitis (H)      Fibromyalgia      History of anesthesia complications     HARD WAKEUP AND LOW BP     PONV (postoperative nausea and vomiting)     motion sickness     Rosacea      Rotator cuff tendonitis, right      Sleep apnea     DOESNT USE CPAP         OBJECTIVE:    Vitals: /60 (Patient Site: Right Arm, Patient Position: Sitting, Cuff Size: Adult Regular)  Pulse 68  Temp 98  F (36.7  C)  Resp 14  Wt 198 lb (89.8 kg)  SpO2 96%  BMI 36.21 kg/m2   BMI= Body mass index is 36.21 kg/(m^2).    General: Sitting on exam table, NAD  HEENT: PERRL, MMM.  Normal Conjunctiva, normal TMs bilaterally, no nasal drainage, no sinus tenderness, moderate pharyngeal erythema without tonsillar enlargement or exudates.  Non-tender/non-enlarged ant cervical nodes.  Neck: Supple  CV: RRR, no m/r/g  Pulm: CTAB, no wheezing, rhonchi or crackles  Ext: Warm, no edema, no erythema  Neuro: Alert and interactive  Psych: Calm, normal affect    Recent Results (from the past 24 hour(s))   Rapid Strep A Screen-Throat   Result Value Ref Range    Rapid Strep A Antigen No Group A Strep detected, presumptive negative No Group A Strep detected, presumptive negative     ASSESSMENT AND PLAN:    Amy Sands is a 57 y.o. female who presents today with sore throat; in stable condition.     1. Pharyngitis, unspecified etiology  - 4-5 days of worsening symptoms.  Reassuring exam other than pharyngitis.  Her rapid strep testing was negative, confirmatory pending.  We discussed that this was likely a viral illness that should slowly improve over the next few days.  She is likely prone to having prolonged symptoms given her rheumatoid arthritis.  I offered trying a one time oral steroid dose which she accepted and was given in clinic.  We will have her continue scheduled Tylenol and/or NSAIDs and use throat lozenges as needed.  Return precautions discussed.  - Group A Strep, RNA Direct Detection, Throat    Options for treatment and/or follow-up care were reviewed with the patient and/or guardian. Amy Sands and/or guardian was engaged and actively involved in the decision making process. She and/or guardian verbalized understanding of the options discussed and was satisfied with the final plan.    Follow up above with PCP in 3-5 days if not improved or sooner if develops new or worsening symptoms.    Boubacar Hadley, DO    This note was created with help of Dragon dictation system. Grammatical /typing errors are not intentional.

## 2021-06-19 NOTE — LETTER
Letter by Margaret Henriquez RN at      Author: Margaret Henriquez RN Service: -- Author Type: --    Filed:  Encounter Date: 5/2/2019 Status: (Other)       Collin Bautista Advance Care Planning    [facility]-[address] [address]    7/23/2019    Amy Sands  [address] [facility] [address]    Dear Ms. Sands,      7/23/2019    We were recently notified that you requested that the order in which your named Health Care Agents in your health care directive dated 2/9/18 be changed. Because this is a legal document, we cannot make this change as requested.    In order to make a change to the order in which your health care agents would serve,  you will need to either provide us with a more recently dated health care directive or create a new one.     We have several options to assist you in creating a new document:   Health Care Directives and Advance Care Planning resources can be viewed and printed for free at our website: [email or web address]    Free group classes on Advance Care Planning and completing a Health Care Directive are available at multiple locations and times. These classes are led by trained staff who will provide information and guide you through a Health Care Directive. They can also review, notarize and add your Health Care Directive to your medical record. [place] for a class at [email or web address] or by calling [employer] at   [phone] or toll free [phone].    COPIES of completed Health Care Directives can be brought or mailed to any of our locations, including the address listed below. You can also email a copy to [email or web address]       Sincerely,    Collin Bautista Advance Care Planning  [facility]  [address] [place] [address]  Email us: [email or web address] Call us: [phone]  Visit at: [email or web address]

## 2021-06-22 ENCOUNTER — RECORDS - HEALTHEAST (OUTPATIENT)
Dept: ADMINISTRATIVE | Facility: OTHER | Age: 61
End: 2021-06-22

## 2021-06-22 NOTE — PROGRESS NOTES
Assessment:     1. Primary osteoarthritis of right knee  CANCELED: XR Knee Left 1 or 2 VWS       Plan:     1. Primary osteoarthritis of right knee  Patient will be prescribed some naproxen 500 mg twice daily which she will take for 3 weeks    2.  Patellofemoral arthritis right knee and kneecap    -Same treatment      Subjective:   I am seeing this patient complaining of pain in her in her right knee she has had previous total knee arthroplasty on the left side for stage IV osteoarthritis she has noticed some pain on the medial aspect of her right knee and some pain going caudad on the medial aspect of her thigh.  Exam shows some tenderness in this area but no crepitus no drawer sign clinical impression was patellofemoral arthritis of the right knee and thigh area.  Treatment will be anti-inflammatories.  She fails to respond we will consider corticosteroid injection and/or orthopedic referral.  Patient of course is trying to lose weight and was urged to lose weight.  I do not feel she will come to total knee arthroplasty on the right side and any immediate future unless pain becomes intractable.  All medical questions asked and answered 14 point review of systems especially of lower extremities with regards to DVT were all negative    Review of Systems: A complete 14 point review of systems was obtained and is negative or as stated in the history of present illness.    Past Medical History:   Diagnosis Date     Arthritis      Chronic bronchitis (H)      Fibromyalgia      History of anesthesia complications     HARD WAKEUP AND LOW BP     PONV (postoperative nausea and vomiting)     motion sickness     Rosacea      Rotator cuff tendonitis, right      Sleep apnea     DOESNT USE CPAP     No family history on file.  Past Surgical History:   Procedure Laterality Date     CARPAL TUNNEL RELEASE Bilateral      HYSTERECTOMY       KNEE ARTHROSCOPY Left      lysis of adhesions for bowel obstruction x 2       DE TOTAL KNEE  ARTHROPLASTY Left 1/7/2016    Procedure: TOTAL KNEE  ARTHROPLASTY;  Surgeon: Boubacar Cortez MD;  Location: [facility];  Service: Orthopedics     Social History     Tobacco Use     Smoking status: Passive Smoke Exposure - Never Smoker     Smokeless tobacco: Never Used   Substance Use Topics     Alcohol use: No     Drug use: No         Objective:   /68   Pulse 84   Wt 203 lb 8 oz (92.3 kg)   BMI 37.22 kg/m      General Appearance:  Alert, cooperative, no distress  Head:  Normocephalic, no obvious abnormality  Ears: TM anatomy normal  Eyes:  PERRL, EOM's intact, conjunctiva and corneas clear  Nose:  Nares symmetrical, septum midline, mucosa pink, no sinus tenderness  Throat:  Lips, tongue, and mucosa are moist, pink, and intact  Neck:  Supple, symmetrical, trachea midline, no adenopathy; thyroid: no enlargement, symmetric,no tenderness/mass/nodules; no carotid bruit, no JVD  Back:  Symmetrical, no curvature, ROM normal, no CVA tenderness  Chest/Breast:  No mass or tenderness  Lungs:  Clear to auscultation bilaterally, respirations unlabored   Heart:  Normal PMI, regular rate & rhythm, S1 and S2 normal, no murmurs, rubs, or gallops  Abdomen:  Soft, non-tender, bowel sounds active all four quadrants, no mass, or organomegaly  Musculoskeletal:  Tone and strength strong and symmetrical, all extremities patient is tender in the right patellofemoral area and in the area of the past anserine tendon x-rays were reviewed she does have some osteoarthritis of the compartment but probably early grade 2  Lymphatic:  No adenopathy  Skin/Hair/Nails:  Skin warm, dry, and intact, no rashes  Neurologic:  Alert and oriented x3, no cranial nerve deficits, normal strength and tone, gait steady  Extremities:  No edema.  Jay's sign negative.    Genitourinary: deferred  Pulses:  Equal bilaterally     The following high BMI interventions were performed this visit: weight monitoring      This note has been dictated using  voice recognition software. Any grammatical or context distortions are unintentional and inherent to the the software.

## 2021-06-23 NOTE — TELEPHONE ENCOUNTER
RN cannot approve Refill Request    RN can NOT refill this medication med is not covered by policy/route to provider.     Last office visit: 12/19/2018 Jose Guadalupe Barcenas MD Last Physical: Visit date not found Last MTM visit: Visit date not found Last visit same specialty: 12/19/2018 Jose Guadalupe Barcenas MD.  Next visit within 3 mo: Visit date not found  Next physical within 3 mo: Visit date not found      Kell Goncalves, [facility] Triage/Med Refill 1/17/2019    Requested Prescriptions   Pending Prescriptions Disp Refills     celecoxib (CELEBREX) 200 MG capsule [Pharmacy Med Name: CELECOXIB 200 MG CAPSULE] 30 capsule 0     Sig: TAKE 1 CAPSULE BY MOUTH EVERY DAY    There is no refill protocol information for this order

## 2021-06-23 NOTE — PROGRESS NOTES
Preoperative Exam    Scheduled Procedure: repair right knee meniscus   Surgery Date:  1/18/19  Surgery Location: [facility]   Surgeon:  Dr. Woods     Assessment/Plan:     1. Pre-op exam  2. Acute torn meniscus of knee, right, sequela  Preoperative exam completed today.  Exam is overall benign.  Hemoglobin is stable at 14.  EKG not indicated today.  Reviewed patient's current medications and allergies.  She may proceed with scheduled procedure with choice of anesthesia.  - Hemoglobin    3. Primary osteoarthritis of left knee  History of osteoarthritis and left knee, status post total knee replacement.    4. Fibromyalgia  Stable on current regimen.  Continue with Lyrica for management.     5. Obstructive chronic bronchitis  History of chronic bronchitis.  Currently asymptomatic.  Continue with Symbicort daily and albuterol as needed for wheezing.    Surgical Procedure Risk: Low (reported cardiac risk generally < 1%)  Have you had prior anesthesia?: YES  Have you or any family members had a previous anesthesia reaction:  Yes: SLOW TO WAKE UP   Do you or any family members have a history of a clotting or bleeding disorder?: No  Cardiac Risk Assessment: no increased risk for major cardiac complications    Patient approved for surgery with general or local anesthesia.    Functional Status: Independent  Patient plans to recover at home with family.     Subjective:      Amy Sands is a 58 y.o. female who presents for a preoperative consultation.  Patient will be undergoing right meniscus repair at [facility] on 11/18/2019.  Reports injuring her knee when walking up the stairs a couple of weeks ago.  Recalls hearing a pop and having extreme pain.  She went to the hospital and was found to have a torn right meniscus.  She has been using crutches and takes ibuprofen for pain management.  Patient has history of left total knee replacement.  She reports tolerating anesthesia well and denies any  complications.  Medical history includes fibromyalgia, symptoms stable on Lyrica.  History of chronic bronchitis.  Continues to use Symbicort daily and albuterol as needed for wheezing.  She feels well today.  Denies any recent illness.  No chest pain, shortness of breath.  She does not have any additional concerns today. All other systems reviewed and are negative, other than those listed in the HPI.    Pertinent History  Do you have difficulty breathing or chest pain after walking up a flight of stairs: No  History of obstructive sleep apnea: No  Steroid use in the last 6 months: No  Frequent Aspirin/NSAID use: No  Prior Blood Transfusion: No  Prior Blood Transfusion Reaction: No  If for some reason prior to, during or after the procedure, if it is medically indicated, would you be willing to have a blood transfusion?:  There is no transfusion refusal.    Current Outpatient Medications   Medication Sig Dispense Refill     albuterol (PROAIR HFA) 90 mcg/actuation inhaler Inhale 2 puffs every 6 (six) hours as needed for wheezing. 1 Inhaler 1     budesonide-formoterol (SYMBICORT) 160-4.5 mcg/actuation inhaler Inhale 2 puffs 2 (two) times a day. 10.2 Inhaler 1     budesonide-formoterol (SYMBICORT) 160-4.5 mcg/actuation inhaler Inhale 2 puffs.       budesonide-formoterol (SYMBICORT) 160-4.5 mcg/actuation inhaler Inhale 2 puffs 2 (two) times a day. 10.2 Inhaler 0     celecoxib (CELEBREX) 200 MG capsule Take 1 capsule (200 mg total) by mouth daily. 30 capsule 0     folic acid (FOLVITE) 1 MG tablet Take 1 mg by mouth.       ibuprofen (ADVIL,MOTRIN) 600 MG tablet Take 1 tablet (600 mg total) by mouth every 6 (six) hours as needed for pain. 30 tablet 0     isoniazid (NYDRAZID) 300 MG tablet Take 1 tablet (300 mg total) by mouth daily. 30 tablet 8     LYRICA 75 mg capsule TAKE ONE CAPSULE BY MOUTH TWICE DAILY 60 capsule 0     methotrexate 2.5 MG tablet Take by mouth once a week.       methotrexate 2.5 MG tablet Take 12.5 mg  by mouth.       oxyCODONE (ROXICODONE) 5 MG immediate release tablet Take 1 tablet (5 mg total) by mouth every 6 (six) hours as needed for pain. 12 tablet 0     pregabalin (LYRICA) 75 MG capsule Take 1 capsule (75 mg total) by mouth 2 (two) times a day. 60 capsule 1     pregabalin (LYRICA) 75 MG capsule Take 1 capsule (75 mg total) by mouth 2 (two) times a day. 180 capsule 1     pregabalin (LYRICA) 75 MG capsule Take 75 mg by mouth.       triamcinolone (KENALOG) 0.1 % cream Apply to affected areas two times daily 30 g 0     No current facility-administered medications for this visit.         Allergies   Allergen Reactions     Hydrocodone Nausea And Vomiting       Patient Active Problem List   Diagnosis     Right Rotator Cuff Tendonitis     Fibromyalgia     Strained Right Pectoralis Major Muscle     Abdominal Pain     Benign Pigmented Nevus     Rosacea     Lentigo     Chronic Bronchitis With Acute Exacerbation     Chest Pain     Joint Pain, Localized In The Knee     Primary osteoarthritis of left knee     Total knee replacement status     Productive cough     TB lung, latent     Visit for screening mammogram     Pneumonia     Pre-op exam     Acute torn meniscus of knee, right, sequela       Past Medical History:   Diagnosis Date     Arthritis      Chronic bronchitis (H)      Fibromyalgia      History of anesthesia complications     HARD WAKEUP AND LOW BP     PONV (postoperative nausea and vomiting)     motion sickness     Rosacea      Rotator cuff tendonitis, right      Sleep apnea     DOESNT USE CPAP       Past Surgical History:   Procedure Laterality Date     CARPAL TUNNEL RELEASE Bilateral      HYSTERECTOMY       KNEE ARTHROSCOPY Left      lysis of adhesions for bowel obstruction x 2       MI TOTAL KNEE ARTHROPLASTY Left 1/7/2016    Procedure: TOTAL KNEE  ARTHROPLASTY;  Surgeon: Boubacar Cortez MD;  Location: [facility];  Service: Orthopedics       Social History     Socioeconomic History     Marital  "status:      Spouse name: Not on file     Number of children: Not on file     Years of education: Not on file     Highest education level: Not on file   Social Needs     Financial resource strain: Not on file     Food insecurity - worry: Not on file     Food insecurity - inability: Not on file     Transportation needs - medical: Not on file     Transportation needs - non-medical: Not on file   Occupational History     Not on file   Tobacco Use     Smoking status: Passive Smoke Exposure - Never Smoker     Smokeless tobacco: Never Used   Substance and Sexual Activity     Alcohol use: No     Drug use: No     Sexual activity: Not on file   Other Topics Concern     Not on file   Social History Narrative     Not on file       Patient Care Team:  Kenneth Acuna MD as PCP - General          Objective:     Vitals:    01/11/19 1046   BP: 144/64   Pulse: 75   SpO2: 95%   Weight: 201 lb 9.6 oz (91.4 kg)   Height: 5' 2\" (1.575 m)         Physical Exam:    General Appearance:    Alert, cooperative, no distress, appears stated age.     Head:    Normocephalic, without obvious abnormality, atraumatic   Eyes:    PERRL, conjunctiva/corneas clear, EOM's intact, fundi     benign, both eyes        Ears:    Normal TM's and external ear canals, both ears   Nose:   Nares normal, septum midline, mucosa normal, no drainage    or sinus tenderness   Throat:   Lips, mucosa, and tongue normal; teeth and gums normal   Neck:   Supple, symmetrical, trachea midline, no adenopathy;        thyroid:  No enlargement/tenderness/nodules; no carotid    bruit or JVD   Back:     Symmetric, no curvature, ROM normal, no CVA tenderness   Lungs:     Clear to auscultation bilaterally, respirations unlabored   Chest wall:    No tenderness or deformity   Heart:    Regular rate and rhythm, S1 and S2 normal, no murmur, rub   or gallop   Abdomen:     Soft, non-tender, bowel sounds active all four quadrants,     no masses, no organomegaly.     Genitalia:   " Deferred per patient.   Rectal:   Deferred per patient.   Extremities:  Right torn meniscus.  Using a crutch to help ambulate.  All other extremities normal, atraumatic, no cyanosis or edema   Pulses:   2+ and symmetric all extremities   Skin:   Skin color, texture, turgor normal, no rashes or lesions   Lymph nodes:   Cervical, supraclavicular, and axillary nodes normal   Neurologic:   CNII-XII intact. Normal strength, sensation and reflexes       throughout         There are no Patient Instructions on file for this visit.    EKG: Not indicated.    Labs:  Recent Results (from the past 24 hour(s))   Hemoglobin    Collection Time: 01/11/19 10:58 AM   Result Value Ref Range    Hemoglobin 14.0 12.0 - 16.0 g/dL       Immunization History   Administered Date(s) Administered     Td, Adult, Absorbed 1959     Td,adult,historic,unspecified 1960     Tdap 12/22/2015           Electronically signed by Enedelia Boyd CNP 01/11/19 10:47 AM

## 2021-06-25 NOTE — PROGRESS NOTES
Assessment/Plan:    1. Urinary urgency  2. Right flank discomfort  Urinalysis is not indicative of a urinary tract infection.  Will await results of urine culture and treat for infection if indicated at that time.  Because differential diagnoses including kidney stones.  Patient feels comfortable monitoring her symptoms until results of urine culture return.  I encouraged adequate hydration.  Discussed red flag symptoms to monitor for that would trigger immediate evaluation including fevers, chills, body aches, worsening flank pain, nausea, vomiting or any new urinary changes.  She is comfortable with this plan.  - Urinalysis    Subjective:    Amy Sands is a 58 year old female seen today for evaluation of urinary frequency, urgency and right-sided flank pain.  Patient reports that she has been told she has kidney stones. She first noticed symptoms yesterday.  Felt some lower abdominal pressure and right CVA tenderness.  She then noticed some urinary frequency and urgency.  She woke up this morning feeling better for the most part but still has some dull discomfort in her lower abdomen and right flank area.  Pain is not worsening.  She denies any blood in the urine.  She has not had fevers, chills or night sweats.  She is trying to stay hydrated.  She has not taken any over-the-counter medications for her symptoms.  She wants to rule out a  urinary tract infection.  Bowel movements are normal.  She does not have any additional concerns today. Review of systems is as stated in HPI, and the remainder of the 10 system review is otherwise unremarkable.    Past Medical History, Family History, and Social History reviewed.    Past Surgical History:   Procedure Laterality Date     CARPAL TUNNEL RELEASE Bilateral      HYSTERECTOMY       KNEE ARTHROSCOPY Left      lysis of adhesions for bowel obstruction x 2       MO TOTAL KNEE ARTHROPLASTY Left 1/7/2016    Procedure: TOTAL KNEE  ARTHROPLASTY;  Surgeon: Boubacar Cortez,  MD;  Location: [facility] OR;  Service: Orthopedics        No family history on file.     Past Medical History:   Diagnosis Date     Arthritis      Chronic bronchitis (H)      Fibromyalgia      History of anesthesia complications     HARD WAKEUP AND LOW BP     PONV (postoperative nausea and vomiting)     motion sickness     Rosacea      Rotator cuff tendonitis, right      Sleep apnea     DOESNT USE CPAP        Social History     Tobacco Use     Smoking status: Passive Smoke Exposure - Never Smoker     Smokeless tobacco: Never Used   Substance Use Topics     Alcohol use: No     Drug use: No        Current Outpatient Medications   Medication Sig Dispense Refill     albuterol (PROAIR HFA) 90 mcg/actuation inhaler Inhale 2 puffs every 6 (six) hours as needed for wheezing. 1 Inhaler 1     budesonide-formoterol (SYMBICORT) 160-4.5 mcg/actuation inhaler Inhale 2 puffs 2 (two) times a day. 10.2 Inhaler 1     budesonide-formoterol (SYMBICORT) 160-4.5 mcg/actuation inhaler Inhale 2 puffs.       budesonide-formoterol (SYMBICORT) 160-4.5 mcg/actuation inhaler Inhale 2 puffs 2 (two) times a day. 10.2 Inhaler 0     celecoxib (CELEBREX) 200 MG capsule TAKE 1 CAPSULE BY MOUTH EVERY DAY 30 capsule 0     folic acid (FOLVITE) 1 MG tablet Take 1 mg by mouth.       gabapentin (NEURONTIN) 100 MG capsule Take 100 mg by mouth 3 (three) times a day.       ibuprofen (ADVIL,MOTRIN) 600 MG tablet Take 1 tablet (600 mg total) by mouth every 6 (six) hours as needed for pain. 30 tablet 0     isoniazid (NYDRAZID) 300 MG tablet Take 1 tablet (300 mg total) by mouth daily. 30 tablet 8     LYRICA 75 mg capsule TAKE ONE CAPSULE BY MOUTH TWICE DAILY 60 capsule 0     methotrexate 2.5 MG tablet Take by mouth once a week.       methotrexate 2.5 MG tablet Take 12.5 mg by mouth.       oxyCODONE (ROXICODONE) 5 MG immediate release tablet Take 1 tablet (5 mg total) by mouth every 6 (six) hours as needed for pain. 12 tablet 0     pregabalin (LYRICA) 75  MG capsule Take 1 capsule (75 mg total) by mouth 2 (two) times a day. 60 capsule 1     pregabalin (LYRICA) 75 MG capsule Take 1 capsule (75 mg total) by mouth 2 (two) times a day. 180 capsule 1     pregabalin (LYRICA) 75 MG capsule Take 75 mg by mouth.       triamcinolone (KENALOG) 0.1 % cream Apply to affected areas two times daily 30 g 0     No current facility-administered medications for this visit.           Objective:    Vitals:    03/20/19 1314   BP: 120/68   Patient Site: Right Arm   Patient Position: Sitting   Cuff Size: Adult Large   Pulse: 80   Weight: 204 lb 3.2 oz (92.6 kg)      Body mass index is 37.35 kg/m .      General Appearance:  Alert, afebrile, cooperative, no distress, appears stated age   Lungs:   Clear to auscultation bilaterally, respirations unlabored.  No expiratory wheeze or inspiratory crackles noted.   Heart:  Regular rate and rhythm, S1, S2 normal, no murmur, rub or gallop   Abdomen:   Soft, mild lower abdomen discomfort with palpation, positive bowel sounds, no masses, no organomegaly   Back:  Mild right-sided CVA tenderness.           This note has been dictated using voice recognition software. Any grammatical or context distortions are unintentional and inherent to the use of this software.

## 2021-06-26 NOTE — PROGRESS NOTES
Patient ID: Amy Sands is a 60 y.o. female.  /76   Pulse 81   Wt 198 lb (89.8 kg)   SpO2 98%   BMI 37.41 kg/m      Assessment/Plan:                   Diagnoses and all orders for this visit:    Cough  -     XR Chest 2 Views    Obstructive chronic bronchitis with exacerbation (H)  -     PFT Complete; Future; Expected date: 06/15/2021  -     Ambulatory referral to Pulmonology  [place]  -     tiotropium (SPIRIVA WITH HANDIHALER) 18 mcg inhalation capsule; Place 1 capsule (2 puffs total) into inhaler and inhale daily. Place 1 capsule into the inhaler device. Close and press button to crush the capsule. Inhale the contents of the crushed capsule in 2 breaths.  Dispense: 30 capsule; Refill: 2  -     predniSONE (DELTASONE) 20 MG tablet; Take 20 mg by mouth daily for 7 days.  Dispense: 7 tablet; Refill: 0    Other orders  -     Cancel: Ambulatory referral to Pulmonology          DISCUSSION  Chest x-ray shows no acute finding.  I suspect she has had gradual progression in the severity of her underlying chronic bronchitis with likely an exacerbation at this time due to weather.  We will continue the Symbicort and intermittent use of albuterol, will add Spiriva to see if this helps to alleviate some of her cough symptoms.  We will also try a short course of prednisone to see if this will help settle the more acute exacerbation.  We will arrange for pulmonary function testing to have a better overall assessment and will plan for pulmonology referral.  Subjective:     HPI    Amy Sands is a 60 y.o. female with chronic bronchitis that is currently treated with Symbicort and intermittent use of albuterol.  Patient reports gradual worsening of her symptoms including cough, more difficulty breathing, shortness of breath especially in hot humid weather.  She denies any abrupt changes.  Denies fevers or chills.  Monitor his oxygen saturation at home reports saturations in the low to mid 90s typically.  She  reports faithful use of her controller medication.  Her previous evaluation was done by an allergy asthma specialist more than 5 years ago.  She has not had reevaluation since.  Documented consult note indicates normal spirometry but no results are available for me to review at this time.  Patient has had other recent health concerns including ulnar problem in the right arm that will require surgery and recent right knee replacement from which she continues to recover.  Review of Systems      Objective:   Medications:  Current Outpatient Medications   Medication Sig Note     albuterol (PROAIR HFA) 90 mcg/actuation inhaler Inhale 2 puffs every 6 (six) hours as needed for wheezing. 6/2/2020: As needed      budesonide-formoterol (SYMBICORT) 160-4.5 mcg/actuation inhaler Inhale 2 puffs 2 (two) times a day.      folic acid (FOLVITE) 1 MG tablet Take 1 mg by mouth. 6/2/2020: On hold for surg      methotrexate 2.5 MG tablet Take by mouth once a week. 6/2/2020: On hold for surg      pregabalin (LYRICA) 75 MG capsule Take 75 mg by mouth.      predniSONE (DELTASONE) 20 MG tablet Take 20 mg by mouth daily for 7 days.      tiotropium (SPIRIVA WITH HANDIHALER) 18 mcg inhalation capsule Place 1 capsule (2 puffs total) into inhaler and inhale daily. Place 1 capsule into the inhaler device. Close and press button to crush the capsule. Inhale the contents of the crushed capsule in 2 breaths.      Allergies:  Allergies   Allergen Reactions     Dilaudid [Hydromorphone] Nausea And Vomiting     Hydrocodone Nausea And Vomiting     Toradol [Ketorolac] Nausea And Vomiting     Tobacco:   reports that she is a non-smoker but has been exposed to tobacco smoke. She has never used smokeless tobacco.     Physical Exam      /76   Pulse 81   Wt 198 lb (89.8 kg)   SpO2 98%   BMI 37.41 kg/m          General Appearance:    Alert, cooperative, no distress   Eyes:   No scleral icterus or conjunctival irritation       Lungs:     Clear to  auscultation bilaterally, respirations unlabored, no wheezes or crackles, she does cough regularly during the course of our exam.   Heart:    Regular rate and rhythm,  No murmur   Extremities:  No edema, no joint swelling or redness, no evidence of any injuries   Skin:  No concerning skin findings, no suspicious moles, no rashes   Neurologic:  On gross examination there is no motor or sensory deficit.  Patient walks with a normal gait

## 2021-06-28 NOTE — PROGRESS NOTES
Progress Notes by Sarah Geiger CNP at 12/17/2019  7:10 AM     Author: Sarah Geiger CNP Service: -- Author Type: Nurse Practitioner    Filed: 12/29/2019  7:18 PM Encounter Date: 12/17/2019 Status: Signed    : Sarah Geiger CNP (Nurse Practitioner)       Chief Complaint   Patient presents with   ? Nasal Congestion   ? Fever     usually late afternoon   ? Cough with blood       ASSESSMENT & PLAN:   Diagnoses and all orders for this visit:    Pneumonia of left upper lobe due to infectious organism (H)  -     doxycycline (MONODOX) 100 MG capsule; Take 1 capsule (100 mg total) by mouth 2 (two) times a day for 7 days.  Dispense: 14 capsule; Refill: 0  -     predniSONE (DELTASONE) 20 MG tablet; Take 40 mg by mouth daily for 5 days.  Dispense: 10 tablet; Refill: 0    Cough  -     Influenza A/B Rapid Test- Nasal Swab  -     XR Chest 2 Views    Fever, unspecified fever cause  -     XR Chest 2 Views    Chronic Bronchitis With Acute Exacerbation  -     albuterol nebulizer solution 3 mL (PROVENTIL)        MDM:  Recheck  In 3 days for patchy pneumonia.  Push fluids, return if fevers return or shortness of breath. Lungs are actually clear today.      Supportive care discussed.  See discharge instructions below for specific recommendations given.    At the end of the encounter, I discussed results, diagnosis, medications. Discussed red flags for immediate return to clinic/ER, as well as indications for follow up if no improvement. Patient and/or caregiver understood and agreed to plan. Patient was stable for discharge.    SUBJECTIVE    HPI:  HPI  Amy Sands presents to the walk-in clinic with   Chief Complaint   Patient presents with   ? Nasal Congestion   ? Fever     usually late afternoon   ? Cough with blood     Fever to 102 last night.  Fever started 3 days ago.  Worse at night/evening.  Gradually worsening over 1 week.      Associated with: hx chronic bronchitis on symbicort, albuterol.     Symptoms  started: over a week ago    Known exposures: none    See ROS for additional symptoms and/or pertinent negatives.       History obtained from the patient.    Past Medical History:   Diagnosis Date   ? Arthritis    ? Chronic bronchitis (H)    ? Fibromyalgia    ? History of anesthesia complications     HARD WAKEUP AND LOW BP   ? Kidney stone    ? PONV (postoperative nausea and vomiting)     motion sickness   ? Rosacea    ? Rotator cuff tendonitis, right    ? Sleep apnea     DOESNT USE CPAP       Active Ambulatory (Non-Hospital) Problems    Diagnosis   ? Hydronephrosis with urinary obstruction due to ureteral calculus   ? Urinary urgency   ? Right flank discomfort   ? Pre-op exam   ? Acute torn meniscus of knee, right, sequela   ? Productive cough   ? TB lung, latent   ? Visit for screening mammogram   ? Pneumonia   ? Total knee replacement status   ? Primary osteoarthritis of left knee   ? Right Rotator Cuff Tendonitis   ? Fibromyalgia   ? Strained Right Pectoralis Major Muscle   ? Abdominal Pain   ? Benign Pigmented Nevus   ? Rosacea   ? Lentigo   ? Chronic Bronchitis With Acute Exacerbation   ? Chest Pain   ? Joint Pain, Localized In The Knee       Family History   Problem Relation Age of Onset   ? Cancer Mother    ? Cancer Father    ? Clotting disorder Sister    ? Cancer Sister    ? Kidney disease Brother    ? Prostate cancer Brother    ? Cancer Brother    ? Urolithiasis Neg Hx    ? Diabetes Neg Hx    ? Gout Neg Hx    ? Heart disease Neg Hx        Social History     Tobacco Use   ? Smoking status: Passive Smoke Exposure - Never Smoker   ? Smokeless tobacco: Never Used   Substance Use Topics   ? Alcohol use: No       Review of Systems   Constitutional: Positive for appetite change and fever.   HENT: Negative for ear pain and sore throat.    Respiratory: Positive for cough.    Cardiovascular: Positive for chest pain (with cough ).   Gastrointestinal: Negative for nausea and vomiting.       OBJECTIVE    Vitals:     12/17/19 0751 12/17/19 0904   BP: 128/82    Pulse: 81 80   Resp: 21    Temp: 98.8  F (37.1  C)    SpO2: 93% 92%   Weight: 193 lb (87.5 kg)        Physical Exam  Constitutional:       Appearance: She is well-developed.   HENT:      Right Ear: External ear normal.      Left Ear: External ear normal.      Nose: No congestion or rhinorrhea.      Mouth/Throat:      Pharynx: No oropharyngeal exudate or posterior oropharyngeal erythema.   Eyes:      General:         Right eye: No discharge.         Left eye: No discharge.   Cardiovascular:      Rate and Rhythm: Normal rate.      Heart sounds: Normal heart sounds. No murmur.   Pulmonary:      Effort: Pulmonary effort is normal. No respiratory distress.      Breath sounds: Normal breath sounds. No wheezing or rales.      Comments: Harsh, dry cough   Chest:      Chest wall: No tenderness.   Lymphadenopathy:      Cervical: No cervical adenopathy.   Skin:     General: Skin is warm and dry.      Findings: No rash.   Neurological:      Mental Status: She is alert and oriented to person, place, and time.   Psychiatric:         Mood and Affect: Mood normal.         Behavior: Behavior normal.         Thought Content: Thought content normal.         Judgment: Judgment normal.         Labs:  No results found for this or any previous visit (from the past 240 hour(s)).      Radiology:    Xr Chest 2 Views    Result Date: 12/17/2019  EXAM: XR CHEST 2 VIEWS LOCATION: [facility] DATE/TIME: 12/17/2019 8:18 AM INDICATION: cough, fever. bloody sputum COMPARISON: 06/14/2018     Patchy infiltrate left upper lobe and lingula compatible with pneumonia. No effusion. Heart size appears normal. Right lung appears clear.    Xr Wrist Right 3 Or More Vws    Result Date: 11/29/2019  EXAM: XR WRIST RIGHT 3 OR MORE VWS LOCATION: [facility] DATE/TIME: 11/29/2019 2:49 PM INDICATION: right wrist pain COMPARISON: None.     Normal joint spaces and alignment. No fracture.      PATIENT  INSTRUCTIONS:   Patient Instructions   You have a left upper lobe pneumonia.      Recheck in 3-4 days.     Prednisone    Use your albuterol inhaler 2 puffs 4 times daily.      Mucinex to help cough up phlegm.      Nyquil at night with sleep.        Patient Education     Pneumonia (Adult)  Pneumonia is an infection deep within the lungs. It is in the small air sacs (alveoli). Pneumonia may be caused by a virus or bacteria. Pneumonia caused by bacteria is usually treated with an antibiotic. Severe cases may need to be treated in the hospital. Milder cases can be treated at home. Symptoms usually start to get better during the first 2 days of treatment.    Home care  Follow these guidelines when caring for yourself at home:    Rest at home for the first 2 to 3 days, or until you feel stronger. Dont let yourself get overly tired when you go back to your activities.    Stay away from cigarette smoke - yours or other peoples.    You may use acetaminophen or ibuprofen to control fever or pain, unless another medicine was prescribed. If you have chronic liver or kidney disease, talk with your healthcare provider before using these medicines. Also talk with your provider if youve had a stomach ulcer or gastrointestinal bleeding. Dont give aspirin to anyone younger than 18 years of age who is ill with a fever. It may cause severe liver damage.    Your appetite may be poor, so a light diet is fine.    Drink 6 to 8 glasses of fluids every day to make sure you are getting enough fluids. Beverages can include water, sport drinks, sodas without caffeine, juices, tea, or soup. Fluids will help loosen secretions in the lung. This will make it easier for you to cough up the phlegm (sputum). If you also have heart or kidney disease, check with your healthcare provider before you drink extra fluids.    Take antibiotic medicine prescribed until it is all gone, even if you are feeling better after a few days.  Follow-up care  Follow up  with your healthcare provider in the next 2 to 3 days, or as advised. This is to be sure the medicine is helping you get better.  If you are 65 or older, you should get a pneumococcal vaccine and a yearly flu (influenza) shot. You should also get these vaccines if you have chronic lung disease like asthma, emphysema, or COPD. Recently, a second type of pneumonia vaccine has become available for everyone over 65 years old. This is in addition to the previous vaccine. Ask your provider about this.  When to seek medical advice  Call your healthcare provider right away if any of these occur:    You dont get better within the first 48 hours of treatment    Shortness of breath gets worse    Rapid breathing (more than 25 breaths per minute)    Coughing up blood    Chest pain gets worse with breathing    Fever of 100.4 F (38 C) or higher that doesnt get better with fever medicine    Weakness, dizziness, or fainting that gets worse    Thirst or dry mouth that gets worse    Sinus pain, headache, or a stiff neck    Chest pain not caused by coughing  Date Last Reviewed: 1/1/2017 2000-2017 [employer]. [address] [place] [address], [place] [address]. All rights reserved. This information is not intended as a substitute for professional medical care. Always follow your healthcare professional's instructions.

## 2021-06-30 ENCOUNTER — HOSPITAL ENCOUNTER (OUTPATIENT)
Dept: RESPIRATORY THERAPY | Facility: HOSPITAL | Age: 61
Discharge: HOME | End: 2021-06-30
Payer: COMMERCIAL

## 2021-06-30 DIAGNOSIS — J44.1 OBSTRUCTIVE CHRONIC BRONCHITIS WITH EXACERBATION (H): ICD-10-CM

## 2021-06-30 LAB — HGB BLD-MCNC: 13.5 G/DL (ref 12–16)

## 2021-07-03 NOTE — ADDENDUM NOTE
Addendum Note by Codie Valle MD at 4/20/2018  2:38 PM     Author: Codie Valle MD Service: -- Author Type: Physician    Filed: 4/20/2018  2:38 PM Encounter Date: 4/15/2018 Status: Signed    : Codie Valle MD (Physician)    Addended by: CODIE VALLE on: 4/20/2018 02:38 PM        Modules accepted: Orders

## 2021-07-04 NOTE — PROGRESS NOTES
Progress Notes by Taryn Ace LRT at 6/30/2021  7:30 AM     Author: Taryn Ace LRT Service: Respiratory Care Author Type: Respiratory Therapist    Filed: 6/30/2021  8:37 AM Date of Service: 6/30/2021  7:30 AM Status: Signed    : Taryn Ace LRT (Respiratory Therapist)       Cpft with pre and post spirometry done using albuterol 2.5 mg neb. Ats standards met except Dlco IV < 90% of Vc.Patient millicent well, results scanned to pt's chart.

## 2021-07-06 VITALS
HEART RATE: 81 BPM | DIASTOLIC BLOOD PRESSURE: 76 MMHG | OXYGEN SATURATION: 98 % | WEIGHT: 198 LBS | BODY MASS INDEX: 37.41 KG/M2 | SYSTOLIC BLOOD PRESSURE: 126 MMHG

## 2021-07-08 ENCOUNTER — RECORDS - HEALTHEAST (OUTPATIENT)
Dept: ADMINISTRATIVE | Facility: OTHER | Age: 61
End: 2021-07-08

## 2021-07-11 PROBLEM — M06.9: Status: ACTIVE | Noted: 2021-07-06

## 2021-07-13 ENCOUNTER — RECORDS - HEALTHEAST (OUTPATIENT)
Dept: ADMINISTRATIVE | Facility: CLINIC | Age: 61
End: 2021-07-13

## 2021-07-20 ENCOUNTER — OFFICE VISIT (OUTPATIENT)
Dept: PULMONOLOGY | Facility: OTHER | Age: 61
End: 2021-07-20
Payer: COMMERCIAL

## 2021-07-20 VITALS
HEIGHT: 62 IN | OXYGEN SATURATION: 95 % | BODY MASS INDEX: 37.04 KG/M2 | WEIGHT: 201.3 LBS | HEART RATE: 87 BPM | DIASTOLIC BLOOD PRESSURE: 74 MMHG | SYSTOLIC BLOOD PRESSURE: 120 MMHG

## 2021-07-20 DIAGNOSIS — R06.09 DYSPNEA ON EXERTION: Primary | ICD-10-CM

## 2021-07-20 PROCEDURE — 99203 OFFICE O/P NEW LOW 30 MIN: CPT | Performed by: INTERNAL MEDICINE

## 2021-07-20 ASSESSMENT — MIFFLIN-ST. JEOR: SCORE: 1431.34

## 2021-07-20 NOTE — PROGRESS NOTES
"Pulmonary Clinic Outpatient Consultation  2021     Assessment and Plan:   #. Asthma, mild persistent. Currently on maximum triple inhaler regimen.  #. Reduced diffusion capacity, concerning for either RA activity or MTX s/e.       Continue w/ Symbicort + Spiriva    CT chest to assess for lung scarring    PFT in 1 year for monitoring    RTC 12 months.    Radha Veliz MD  Pulmonary and Critical Care   ______________________________________________________________________________    CC: recurrent bronchitis    HPI:   Amy Sands is a 60 year old smoker with history of RA, latent TB, presenting today for evaluation of chronic bronchitis. She is on Symbicort & Spiriva (recent addition).     Reports that she has been seen by an allergist in the past. She has been on Symbicort for the last 6 years that was started after a bout of pneumonia. Reports recurrent bronchitis since childhood, & this has been getting worse over time w/ bronchitis settling into pneumonias more frequently. She has daily cough that is normally productive of white phlegm. When she gets sick her phlegm becomes yellow & green. She has exertional dyspnea that has been getting worse over the last year. Her PCP added Spiriva w/in the last month; this seems to have helped w/ chest tightness & dyspnea; her cough is unchanged. Her distance limit is about a mile. A flight of stairs would be problematic for her. She uses albuterol only once a week nowadays.     Both of her parents were smokers & she had significant second hand exposure. She herself is a never smoker. Both parents  of lung cancer. Family history is also notable for brain cancer, bone cancer, melanoma, VTE. No history of asthma or allergies. Her mother had RA.     ROS: 10 point ROS reviewed and noted to be negative w/ exceptions as detailed in the HPI.    Physical Exam:  /74   Pulse 87   Ht 1.575 m (5' 2\")   Wt 91.3 kg (201 lb 4.8 oz)   SpO2 95%   BMI 36.82 kg/m    Gen: " alert, oriented, no distress  HEENT: masked, PERRL; no stridor  CV: RRR, no M/G/R  Resp: equal bilateral air entry, breath sounds clear throughout, no focal crackles or wheezes; able to converse in full sentences w/ no respiratory distress  Abd: soft, nontender, no palpable organomegaly  Skin: no apparent rashes  Ext: no cyanosis, no clubbing, no BLE edema  Neuro: alert, nonfocal    Labs: personally reviewed in the EMR.  Imaging studies: personally reviewed and interpreted. Below are the Radiology interpretations.  CXR, 6/15/21: negative chest.      PFT's (6/30/21): normal spirometry w/o BD response, normal lung volumes, mildly reduced diffusion capacity.       PMH:  Patient Active Problem List    Diagnosis Date Noted     Rheumatoid arthritis involving right wrist, unspecified whether rheumatoid factor present (H) 07/06/2021     Priority: Medium     Obesity (BMI 35.0-39.9) with comorbidity (H) 06/02/2020     Priority: Medium     Hydronephrosis with urinary obstruction due to ureteral calculus      Priority: Medium     Urinary urgency 03/20/2019     Priority: Medium     Right flank discomfort 03/20/2019     Priority: Medium     Pre-op exam 01/11/2019     Priority: Medium     Fibromyalgia      Priority: Medium     Created by Conversion         Productive cough 06/14/2018     Priority: Medium     TB lung, latent 06/14/2018     Priority: Medium     Visit for screening mammogram 06/14/2018     Priority: Medium     Right Rotator Cuff Tendonitis      Priority: Medium     Created by Conversion  Replacement Utility updated for latest IMO load         Benign Pigmented Nevus      Priority: Medium     Created by Conversion  Replacement Utility updated for latest IMO load         Total knee replacement status 01/09/2016     Priority: Medium     Primary osteoarthritis of left knee 01/07/2016     Priority: Medium     Chronic Bronchitis With Acute Exacerbation      Priority: Medium     Created by Conversion         Strained Right  Pectoralis Major Muscle      Priority: Medium     Created by Conversion         Rosacea      Priority: Medium     Created by Conversion         Lentigo      Priority: Medium     Created by Conversion         Chest Pain      Priority: Medium     Created by Conversion         Joint Pain, Localized In The Knee      Priority: Medium     Created by Conversion           PSH:  Past Surgical History:   Procedure Laterality Date     ARTHROSCOPY KNEE Left      C TOTAL KNEE ARTHROPLASTY Left 1/7/2016    Procedure: TOTAL KNEE  ARTHROPLASTY;  Surgeon: Boubacar Cortez MD;  Location: [facility] OR;  Service: Orthopedics     COMBINED CYSTOSCOPY, INSERT STENT URETER(S) Right 3/25/2019    Procedure: CYSTOSCOPY, RIGHT URETEROSCOPY STENT INSERTION AND RETROGRADE PYELOGRAM;  Surgeon: Kenneth Lopez MD;  Location: [facility] OR;  Service: Urology     HYSTERECTOMY       OOPHORECTOMY       OTHER SURGICAL HISTORY      lysis of adhesions for bowel obstruction x 2     RELEASE CARPAL TUNNEL Bilateral        Family HX: family history includes Cancer in her brother, father, mother, and sister; Clotting Disorder in her sister; Kidney Disease in her brother; Prostate Cancer in her brother.    Social Hx:  reports that she is a non-smoker but has been exposed to tobacco smoke. She has never used smokeless tobacco. She reports that she does not drink alcohol and does not use drugs.     Current Meds:  Current Outpatient Medications   Medication Sig Dispense Refill     albuterol (PROAIR HFA) 90 mcg/actuation inhaler [ALBUTEROL (PROAIR HFA) 90 MCG/ACTUATION INHALER] Inhale 2 puffs every 6 (six) hours as needed for wheezing. 1 Inhaler 1     budesonide-formoterol (SYMBICORT) 160-4.5 mcg/actuation inhaler [BUDESONIDE-FORMOTEROL (SYMBICORT) 160-4.5 MCG/ACTUATION INHALER] Inhale 2 puffs 2 (two) times a day. 10.2 Inhaler 1     folic acid (FOLVITE) 1 MG tablet [FOLIC ACID (FOLVITE) 1 MG TABLET] Take 1 mg by mouth.       methotrexate 2.5  MG tablet [METHOTREXATE 2.5 MG TABLET] Take by mouth once a week.       pregabalin (LYRICA) 75 MG capsule [PREGABALIN (LYRICA) 75 MG CAPSULE] Take 75 mg by mouth.       tiotropium (SPIRIVA WITH HANDIHALER) 18 mcg inhalation capsule [TIOTROPIUM (SPIRIVA WITH HANDIHALER) 18 MCG INHALATION CAPSULE] Place 1 capsule (2 puffs total) into inhaler and inhale daily. Place 1 capsule into the inhaler device. Close and press button to crush the capsule. Inhale the contents of the crushed capsule in 2 breaths. 30 capsule 2       Allergies:  Allergies   Allergen Reactions     Dilaudid [Hydromorphone] Nausea and Vomiting     Hydrocodone Nausea and Vomiting     Toradol [Ketorolac] Nausea and Vomiting

## 2021-07-20 NOTE — PATIENT INSTRUCTIONS
Plan:    Continue with Symbicort & Spiriva -- with this combination you are on maximal triple therapy for inhalers that are available for treatment of asthma. Since your lung function testing is normal, your presentation is most consistent with asthma as the cause of shortness of breath & recurrent bronchitis.     The only abnormality on your lung function testing was reduced oxygen diffusion through the lung tissue. This is something that we sometimes see with rheumatoid arthritis since this disease affects more than joints. I want to get a CT scan of your chest to look for any scarring.     We should routinely follow your oxygen diffusion & lung function to monitor any rheumatoid arthritis activity.     You should follow up in 12 months.     If you have any questions or concerns, please, call our clinic at [phone].

## 2021-07-23 ENCOUNTER — TRANSFERRED RECORDS (OUTPATIENT)
Dept: HEALTH INFORMATION MANAGEMENT | Facility: CLINIC | Age: 61
End: 2021-07-23

## 2021-07-30 ENCOUNTER — HOSPITAL ENCOUNTER (OUTPATIENT)
Dept: CT IMAGING | Facility: CLINIC | Age: 61
Discharge: HOME | End: 2021-07-30
Attending: INTERNAL MEDICINE | Admitting: INTERNAL MEDICINE
Payer: COMMERCIAL

## 2021-07-30 DIAGNOSIS — R06.09 DYSPNEA ON EXERTION: ICD-10-CM

## 2021-07-30 PROCEDURE — 71250 CT THORAX DX C-: CPT

## 2021-08-27 ENCOUNTER — TRANSFERRED RECORDS (OUTPATIENT)
Dept: HEALTH INFORMATION MANAGEMENT | Facility: CLINIC | Age: 61
End: 2021-08-27

## 2021-09-04 ENCOUNTER — HEALTH MAINTENANCE LETTER (OUTPATIENT)
Age: 61
End: 2021-09-04

## 2021-10-14 ENCOUNTER — IMMUNIZATION (OUTPATIENT)
Dept: NURSING | Facility: CLINIC | Age: 61
End: 2021-10-14
Payer: COMMERCIAL

## 2021-10-14 PROCEDURE — 0004A PR COVID VAC PFIZER DIL RECON 30 MCG/0.3 ML IM: CPT

## 2021-10-14 PROCEDURE — 91300 PR COVID VAC PFIZER DIL RECON 30 MCG/0.3 ML IM: CPT

## 2021-11-05 ENCOUNTER — TRANSFERRED RECORDS (OUTPATIENT)
Dept: HEALTH INFORMATION MANAGEMENT | Facility: CLINIC | Age: 61
End: 2021-11-05
Payer: COMMERCIAL

## 2021-12-03 ENCOUNTER — HOSPITAL ENCOUNTER (OUTPATIENT)
Dept: MAMMOGRAPHY | Facility: CLINIC | Age: 61
Discharge: HOME | End: 2021-12-03
Attending: FAMILY MEDICINE | Admitting: FAMILY MEDICINE
Payer: COMMERCIAL

## 2021-12-03 DIAGNOSIS — Z12.31 VISIT FOR SCREENING MAMMOGRAM: ICD-10-CM

## 2021-12-03 PROCEDURE — 77067 SCR MAMMO BI INCL CAD: CPT

## 2022-01-21 ENCOUNTER — TRANSFERRED RECORDS (OUTPATIENT)
Dept: HEALTH INFORMATION MANAGEMENT | Facility: CLINIC | Age: 62
End: 2022-01-21
Payer: COMMERCIAL

## 2022-04-28 ENCOUNTER — TRANSFERRED RECORDS (OUTPATIENT)
Dept: HEALTH INFORMATION MANAGEMENT | Facility: CLINIC | Age: 62
End: 2022-04-28
Payer: COMMERCIAL

## 2022-05-25 DIAGNOSIS — J45.909 ASTHMA: Primary | ICD-10-CM

## 2022-07-27 ENCOUNTER — TRANSFERRED RECORDS (OUTPATIENT)
Dept: HEALTH INFORMATION MANAGEMENT | Facility: CLINIC | Age: 62
End: 2022-07-27

## 2022-08-08 ENCOUNTER — ALLIED HEALTH/NURSE VISIT (OUTPATIENT)
Dept: PULMONOLOGY | Facility: OTHER | Age: 62
End: 2022-08-08
Payer: COMMERCIAL

## 2022-08-08 DIAGNOSIS — J45.909 ASTHMA: ICD-10-CM

## 2022-08-08 LAB
DLCOCOR-%PRED-PRE: 94 %
DLCOCOR-PRE: 17.71 ML/MIN/MMHG
DLCOUNC-%PRED-PRE: 93 %
DLCOUNC-PRE: 17.43 ML/MIN/MMHG
DLCOUNC-PRED: 18.66 ML/MIN/MMHG
ERV-%PRED-PRE: 133 %
ERV-PRE: 0.41 L
ERV-PRED: 0.3 L
EXPTIME-PRE: 5.81 SEC
FEF2575-%PRED-POST: 118 %
FEF2575-%PRED-PRE: 133 %
FEF2575-POST: 2.43 L/SEC
FEF2575-PRE: 2.74 L/SEC
FEF2575-PRED: 2.05 L/SEC
FEFMAX-%PRED-PRE: 87 %
FEFMAX-PRE: 5.07 L/SEC
FEFMAX-PRED: 5.82 L/SEC
FEV1-%PRED-PRE: 100 %
FEV1-PRE: 2.27 L
FEV1FEV6-PRE: 86 %
FEV1FEV6-PRED: 80 %
FEV1FVC-PRE: 86 %
FEV1FVC-PRED: 79 %
FEV1SVC-PRE: 87 %
FEV1SVC-PRED: 76 %
FIFMAX-PRE: 4.96 L/SEC
FRCPLETH-%PRED-PRE: 74 %
FRCPLETH-PRE: 1.92 L
FRCPLETH-PRED: 2.59 L
FVC-%PRED-PRE: 92 %
FVC-PRE: 2.65 L
FVC-PRED: 2.86 L
HGB BLD-MCNC: 12.9 G/DL
IC-%PRED-PRE: 83 %
IC-PRE: 2.21 L
IC-PRED: 2.65 L
RVPLETH-%PRED-PRE: 81 %
RVPLETH-PRE: 1.52 L
RVPLETH-PRED: 1.86 L
TLCPLETH-%PRED-PRE: 89 %
TLCPLETH-PRE: 4.14 L
TLCPLETH-PRED: 4.6 L
VA-%PRED-PRE: 84 %
VA-PRE: 3.73 L
VC-%PRED-PRE: 88 %
VC-PRE: 2.62 L
VC-PRED: 2.95 L

## 2022-08-08 PROCEDURE — 94060 EVALUATION OF WHEEZING: CPT | Performed by: INTERNAL MEDICINE

## 2022-08-08 PROCEDURE — 94729 DIFFUSING CAPACITY: CPT | Performed by: INTERNAL MEDICINE

## 2022-08-08 PROCEDURE — 94726 PLETHYSMOGRAPHY LUNG VOLUMES: CPT | Performed by: INTERNAL MEDICINE

## 2022-08-08 PROCEDURE — 85018 HEMOGLOBIN: CPT

## 2022-08-11 NOTE — RESULT ENCOUNTER NOTE
PFT with normal spirometry, no clinically significant bronchodilator response, normal lung volumes, and normal diffusion capacity.  Compared with testing on 6/30/2021 there has been a clinically significant improvement in patient's lung function, total lung capacity, and diffusion capacity. OB

## 2022-10-22 ENCOUNTER — HEALTH MAINTENANCE LETTER (OUTPATIENT)
Age: 62
End: 2022-10-22

## 2022-10-31 ENCOUNTER — TRANSFERRED RECORDS (OUTPATIENT)
Dept: HEALTH INFORMATION MANAGEMENT | Facility: CLINIC | Age: 62
End: 2022-10-31

## 2022-11-10 NOTE — PROGRESS NOTES
Pulmonary Clinic Outpatient Follow-Up  11/11/2022     Assessment and Plan:   #. Asthma, persistent. Well-controlled on triple inhaler regimen.  #. Rheumatoid arthritis without any evidence of ILD. She is clearly still have systemic symptoms despite her MTX therapy.   #. RESOLVED reduced diffusion capacity.      Continue w/ Symbicort & Spiriva    PRN albuterol & DuoNeb available    Provided w/ a standing Rx for prednisone + Z-pack to call into her pharmacy (for her annual exacerbation/bronchitis)    Action Plan -- prednisone 40 mg daily x 7 days with a Z-Lino. May repeat x1, but if fails 2 action plan activations back-to-back, will need to go the the ED.    She already had her flu vaccine & COVID booster    RTC: 12 months.    Radha Veliz MD  Pulmonary and Critical Care   ______________________________________________________________________________    CC: follow up    HPI:   Amy Sands is a 62 year old smoker with history of photokeratitis, chronic TB, chronic bronchitis in setting of mild persistent asthma, presenting today for follow up of her breathing.  Established care in our clinic 7/20/2021; at that time we continued her on Symbicort and Spiriva.  Her follow-up PFT on 8/8/2022 showed normal spirometry, normal lung volumes, normal diffusion capacity without clinically significant bronchodilator response.  These results showed clinically significant improvement in lung function, total lung capacity, and diffusion capacity compared to prior testing on 6/30/2021.  She had a noncontrast CT chest on 7/30/2021 that showed no evidence of interstitial lung disease that may be associated with rheumatoid arthritis.    Reports doing well overall. She has a hard time w/ her symptoms during the summer when it is humid & in the winter when exposed to cold air. She has no issues w/ her maintenance inhalers, but has to use her albuterol 1-2x/week currently due to the cold air. Sometimes a prolonged cold air exposure causes  her respiratory symptoms to worsen & she has to use her nebulizer -- this typically lasts for 2-3 days. She does get an annual episode of acute bronchitis after acquiring a viral illness in the winter season -- she then sees her PCP & goes on a prednisone/antibiotic course; typically her annual bronchitis tends to last 2-3 weeks. She has never been hospitalized for an asthma exacerbation, but has been in the ED for one.     REVIEW OF SYSTEMS: 10-point ROS was negative with exceptions as detailed in the HPI.    Physical Exam:  /70 (BP Location: Right arm, Patient Position: Chair, Cuff Size: Adult Regular)   Pulse 68   Wt 80.7 kg (178 lb)   SpO2 97%   BMI 32.56 kg/m    Gen: alert, oriented, no distress  HEENT: masked, PERLL; no stridor  CV: RRR, no M/G/R  Resp: equal bilateral air entry, breath sounds clear throughout, no focal crackles or wheezes. Talking in full sentences w/ no respiratory distress  Abd: deferred  Skin: no apparent rashes  Ext: no cyanosis, clubbing or edema  Neuro: alert, nonfocal    Labs: personally reviewed & interpreted in EMR.   Imaging & Procedures: personally reviewed & interpreted, including formal Radiology reports in the EMR.      MEDICAL HISTORY:  has a past medical history of Arthritis, Arthritis, Chronic bronchitis (H), Chronic bronchitis (H), Fibromyalgia, Fibromyalgia, History of anesthesia complications, History of anesthesia complications, Kidney stone, Kidney stone, PONV (postoperative nausea and vomiting), PONV (postoperative nausea and vomiting), Rosacea, Rosacea, Rotator cuff tendonitis, right, Rotator cuff tendonitis, right, Sleep apnea, and Sleep apnea.  SURGICAL HISTORY:  has a past surgical history that includes Hysterectomy; other surgical history; Arthroscopy knee (Left); Release carpal tunnel (Bilateral); TOTAL KNEE ARTHROPLASTY (Left, 1/7/2016); Combined Cystoscopy, Insert Stent Ureter(s) (Right, 3/25/2019); and Oophorectomy.  SOCIAL HISTORY:  reports that she  is a non-smoker but has been exposed to tobacco smoke. She has never used smokeless tobacco. She reports that she does not drink alcohol and does not use drugs.  FAMILY HISTORY: family history includes Cancer in her brother, father, mother, and sister; Clotting Disorder in her sister; Kidney Disease in her brother; Prostate Cancer in her brother.    MEDICATIONS: personally reviewed, including EMR/Care Everywhere. Pertinent information noted & updated.   ALLERGIES:   Allergies   Allergen Reactions     Dilaudid [Hydromorphone] Nausea and Vomiting     Hydrocodone Nausea and Vomiting     Toradol [Ketorolac] Nausea and Vomiting

## 2022-11-11 ENCOUNTER — OFFICE VISIT (OUTPATIENT)
Dept: PULMONOLOGY | Facility: OTHER | Age: 62
End: 2022-11-11
Payer: COMMERCIAL

## 2022-11-11 VITALS
WEIGHT: 178 LBS | BODY MASS INDEX: 32.56 KG/M2 | HEART RATE: 68 BPM | DIASTOLIC BLOOD PRESSURE: 70 MMHG | OXYGEN SATURATION: 97 % | SYSTOLIC BLOOD PRESSURE: 126 MMHG

## 2022-11-11 DIAGNOSIS — J44.1 OBSTRUCTIVE CHRONIC BRONCHITIS WITH EXACERBATION (H): ICD-10-CM

## 2022-11-11 PROCEDURE — 99213 OFFICE O/P EST LOW 20 MIN: CPT | Performed by: INTERNAL MEDICINE

## 2022-11-11 RX ORDER — AZITHROMYCIN 250 MG/1
TABLET, FILM COATED ORAL
Qty: 6 TABLET | Refills: 0 | Status: SHIPPED | OUTPATIENT
Start: 2022-11-11 | End: 2022-11-16

## 2022-11-11 RX ORDER — BUDESONIDE AND FORMOTEROL FUMARATE DIHYDRATE 160; 4.5 UG/1; UG/1
2 AEROSOL RESPIRATORY (INHALATION) 2 TIMES DAILY
Qty: 10.2 G | Refills: 11 | Status: SHIPPED | OUTPATIENT
Start: 2022-11-11

## 2022-11-11 RX ORDER — TIOTROPIUM BROMIDE 18 UG/1
18 CAPSULE ORAL; RESPIRATORY (INHALATION) DAILY
Qty: 90 CAPSULE | Refills: 4 | Status: SHIPPED | OUTPATIENT
Start: 2022-11-11

## 2022-11-11 RX ORDER — PREDNISONE 20 MG/1
40 TABLET ORAL DAILY
Qty: 14 TABLET | Refills: 0 | Status: SHIPPED | OUTPATIENT
Start: 2022-11-11 | End: 2022-11-18

## 2022-11-11 ASSESSMENT — ASTHMA QUESTIONNAIRES
QUESTION_5 LAST FOUR WEEKS HOW WOULD YOU RATE YOUR ASTHMA CONTROL: WELL CONTROLLED
QUESTION_3 LAST FOUR WEEKS HOW OFTEN DID YOUR ASTHMA SYMPTOMS (WHEEZING, COUGHING, SHORTNESS OF BREATH, CHEST TIGHTNESS OR PAIN) WAKE YOU UP AT NIGHT OR EARLIER THAN USUAL IN THE MORNING: ONCE A WEEK
QUESTION_2 LAST FOUR WEEKS HOW OFTEN HAVE YOU HAD SHORTNESS OF BREATH: MORE THAN ONCE A DAY
QUESTION_1 LAST FOUR WEEKS HOW MUCH OF THE TIME DID YOUR ASTHMA KEEP YOU FROM GETTING AS MUCH DONE AT WORK, SCHOOL OR AT HOME: A LITTLE OF THE TIME
ACT_TOTALSCORE: 17
ACT_TOTALSCORE: 17
QUESTION_4 LAST FOUR WEEKS HOW OFTEN HAVE YOU USED YOUR RESCUE INHALER OR NEBULIZER MEDICATION (SUCH AS ALBUTEROL): NOT AT ALL

## 2022-11-11 NOTE — PATIENT INSTRUCTIONS
Plan:  Continue your inhalers.  I sent you prescriptions for prednisone & a Z-pack to use during your bronchitis episodes (you should call your pharmacy when you want it filled)    You should follow up in 12 months or earlier if any new breathing issues arise.     If you have any questions or concerns, please, call our clinic at [phone].

## 2022-12-13 ENCOUNTER — HOSPITAL ENCOUNTER (OUTPATIENT)
Dept: MAMMOGRAPHY | Facility: CLINIC | Age: 62
Discharge: HOME | End: 2022-12-13
Attending: FAMILY MEDICINE | Admitting: FAMILY MEDICINE
Payer: COMMERCIAL

## 2022-12-13 DIAGNOSIS — Z12.31 VISIT FOR SCREENING MAMMOGRAM: ICD-10-CM

## 2022-12-13 PROCEDURE — 77067 SCR MAMMO BI INCL CAD: CPT

## 2023-01-13 ENCOUNTER — TRANSFERRED RECORDS (OUTPATIENT)
Dept: HEALTH INFORMATION MANAGEMENT | Facility: CLINIC | Age: 63
End: 2023-01-13

## 2023-01-23 ENCOUNTER — OFFICE VISIT (OUTPATIENT)
Dept: FAMILY MEDICINE | Facility: CLINIC | Age: 63
End: 2023-01-23
Payer: COMMERCIAL

## 2023-01-23 VITALS
DIASTOLIC BLOOD PRESSURE: 76 MMHG | RESPIRATION RATE: 18 BRPM | OXYGEN SATURATION: 96 % | SYSTOLIC BLOOD PRESSURE: 128 MMHG | HEIGHT: 62 IN | HEART RATE: 80 BPM | BODY MASS INDEX: 33.31 KG/M2 | TEMPERATURE: 98 F | WEIGHT: 181 LBS

## 2023-01-23 DIAGNOSIS — R10.11 ABDOMINAL PAIN, RIGHT UPPER QUADRANT: Primary | ICD-10-CM

## 2023-01-23 LAB
ALBUMIN SERPL BCG-MCNC: 4.4 G/DL (ref 3.5–5.2)
ALBUMIN UR-MCNC: NEGATIVE MG/DL
ALP SERPL-CCNC: 63 U/L (ref 35–104)
ALT SERPL W P-5'-P-CCNC: 19 U/L (ref 10–35)
ANION GAP SERPL CALCULATED.3IONS-SCNC: 13 MMOL/L (ref 7–15)
APPEARANCE UR: CLEAR
AST SERPL W P-5'-P-CCNC: 25 U/L (ref 10–35)
BASOPHILS # BLD AUTO: 0.1 10E3/UL (ref 0–0.2)
BASOPHILS NFR BLD AUTO: 1 %
BILIRUB SERPL-MCNC: 0.4 MG/DL
BILIRUB UR QL STRIP: NEGATIVE
BUN SERPL-MCNC: 19.1 MG/DL (ref 8–23)
CALCIUM SERPL-MCNC: 10 MG/DL (ref 8.8–10.2)
CHLORIDE SERPL-SCNC: 105 MMOL/L (ref 98–107)
COLOR UR AUTO: YELLOW
CREAT SERPL-MCNC: 0.97 MG/DL (ref 0.51–0.95)
DEPRECATED HCO3 PLAS-SCNC: 25 MMOL/L (ref 22–29)
EOSINOPHIL # BLD AUTO: 0.2 10E3/UL (ref 0–0.7)
EOSINOPHIL NFR BLD AUTO: 2 %
ERYTHROCYTE [DISTWIDTH] IN BLOOD BY AUTOMATED COUNT: 13.1 % (ref 10–15)
GFR SERPL CREATININE-BSD FRML MDRD: 65 ML/MIN/1.73M2
GLUCOSE SERPL-MCNC: 110 MG/DL (ref 70–99)
GLUCOSE UR STRIP-MCNC: NEGATIVE MG/DL
HCT VFR BLD AUTO: 42.3 % (ref 35–47)
HGB BLD-MCNC: 14.2 G/DL (ref 11.7–15.7)
HGB UR QL STRIP: NEGATIVE
IMM GRANULOCYTES # BLD: 0 10E3/UL
IMM GRANULOCYTES NFR BLD: 0 %
KETONES UR STRIP-MCNC: NEGATIVE MG/DL
LEUKOCYTE ESTERASE UR QL STRIP: NEGATIVE
LYMPHOCYTES # BLD AUTO: 3.6 10E3/UL (ref 0.8–5.3)
LYMPHOCYTES NFR BLD AUTO: 42 %
MCH RBC QN AUTO: 31 PG (ref 26.5–33)
MCHC RBC AUTO-ENTMCNC: 33.6 G/DL (ref 31.5–36.5)
MCV RBC AUTO: 92 FL (ref 78–100)
MONOCYTES # BLD AUTO: 0.8 10E3/UL (ref 0–1.3)
MONOCYTES NFR BLD AUTO: 9 %
NEUTROPHILS # BLD AUTO: 4 10E3/UL (ref 1.6–8.3)
NEUTROPHILS NFR BLD AUTO: 46 %
NITRATE UR QL: NEGATIVE
PH UR STRIP: 7.5 [PH] (ref 5–8)
PLATELET # BLD AUTO: 257 10E3/UL (ref 150–450)
POTASSIUM SERPL-SCNC: 5.5 MMOL/L (ref 3.4–5.3)
PROT SERPL-MCNC: 7.7 G/DL (ref 6.4–8.3)
RBC # BLD AUTO: 4.58 10E6/UL (ref 3.8–5.2)
SODIUM SERPL-SCNC: 143 MMOL/L (ref 136–145)
SP GR UR STRIP: 1.02 (ref 1–1.03)
UROBILINOGEN UR STRIP-ACNC: 0.2 E.U./DL
WBC # BLD AUTO: 8.6 10E3/UL (ref 4–11)

## 2023-01-23 PROCEDURE — 36415 COLL VENOUS BLD VENIPUNCTURE: CPT | Performed by: FAMILY MEDICINE

## 2023-01-23 PROCEDURE — 85025 COMPLETE CBC W/AUTO DIFF WBC: CPT | Performed by: FAMILY MEDICINE

## 2023-01-23 PROCEDURE — 80053 COMPREHEN METABOLIC PANEL: CPT | Performed by: FAMILY MEDICINE

## 2023-01-23 PROCEDURE — 81003 URINALYSIS AUTO W/O SCOPE: CPT | Performed by: FAMILY MEDICINE

## 2023-01-23 PROCEDURE — 99214 OFFICE O/P EST MOD 30 MIN: CPT | Performed by: FAMILY MEDICINE

## 2023-01-23 ASSESSMENT — PAIN SCALES - GENERAL: PAINLEVEL: MILD PAIN (2)

## 2023-01-23 NOTE — PROGRESS NOTES
"Amy Sands  /76   Pulse 80   Temp 98  F (36.7  C)   Resp 18   Ht 1.575 m (5' 2\")   Wt 82.1 kg (181 lb)   SpO2 96%   BMI 33.11 kg/m       Assessment/Plan:                Amy was seen today for abdominal pain.    Diagnoses and all orders for this visit:    Abdominal pain, right upper quadrant  -     Comprehensive metabolic panel  -     CBC with Platelets & Differential  -     UA Macro with Reflex to Micro and Culture - lab collect         DISCUSSION  No indication that there is an acute abdomen.  Will obtain additional lab tests including urinalysis and those as listed above.  We must consider nephrolithiasis, cholelithiasis and other abdominal irritation.  Patient agrees that should she have any significant worsening pain she will present to the emergency department for more immediate evaluation otherwise based on lab testing we will decide on further course of action which may include imaging.  In the meantime work to maintain hydration and nutrition by following the bland diet.  Subjective:     HPI:    Amy Sands is a 62 year old female reports 1-1/2 weeks of symptoms including right-sided abdominal pain that is somewhat intermittent.  She feels a fullness after eating.  She is still able to eat and drink.  No change with bowel movements.  No vomiting or diarrhea.  No dysuria, no frequency no hematuria.  She does have history of both cholelithiasis as well as nephrolithiasis.  Last bout with kidney stone was in 2019.  Review of CT scan does not indicate any additional stones noted other than the obstructing stone at the time.  Patient reports no fevers or chills.  Denies shortness of breath or chest pain.    ROS:  Complete review of systems is obtained.  Other than the specific considerations noted above complete review of systems is negative.          Objective:   Medications:  Current Outpatient Medications   Medication     albuterol (PROAIR HFA) 90 mcg/actuation inhaler     " budesonide-formoterol (SYMBICORT) 160-4.5 MCG/ACT Inhaler     folic acid (FOLVITE) 1 MG tablet     methotrexate 2.5 MG tablet     pregabalin (LYRICA) 75 MG capsule     tiotropium (SPIRIVA HANDIHALER) 18 MCG inhaled capsule     No current facility-administered medications for this visit.        Allergies:     Allergies   Allergen Reactions     Dilaudid [Hydromorphone] Nausea and Vomiting     Hydrocodone Nausea and Vomiting     Toradol [Ketorolac] Nausea and Vomiting        Social History     Socioeconomic History     Marital status:      Spouse name: Not on file     Number of children: Not on file     Years of education: Not on file     Highest education level: Not on file   Occupational History     Not on file   Tobacco Use     Smoking status: Never     Passive exposure: Yes     Smokeless tobacco: Never   Vaping Use     Vaping Use: Never used   Substance and Sexual Activity     Alcohol use: No     Drug use: No     Sexual activity: Not on file   Other Topics Concern     Not on file   Social History Narrative     Not on file     Social Determinants of Health     Financial Resource Strain: Not on file   Food Insecurity: Not on file   Transportation Needs: Not on file   Physical Activity: Not on file   Stress: Not on file   Social Connections: Not on file   Intimate Partner Violence: Not on file   Housing Stability: Not on file       Family History   Problem Relation Age of Onset     Cancer Mother      Cancer Father      Clotting Disorder Sister      Cancer Sister      Kidney Disease Brother      Prostate Cancer Brother      Cancer Brother      Urolithiasis No family hx of      Diabetes No family hx of      Gout No family hx of      Heart Disease No family hx of         Most Recent Immunizations   Administered Date(s) Administered     COVID-19 Vaccine 12+ (Pfizer) 10/14/2021     COVID-19 Vaccine Bivalent Booster 12+ (Pfizer) 09/15/2022     Influenza Vaccine 50-64 or 18-64 w/egg allergy (Flublok) 09/24/2020      "Influenza Vaccine >6 months (Alfuria,Fluzone) 09/15/2022     Influenza,INJ,MDCK,PF,Quad >6mo(Flucelvax) 09/24/2020     Pneumococcal 23 valent 11/09/2020     Td (Adult), Adsorbed 1959     Tdap (Adacel,Boostrix) 12/22/2015     Tdap (Adult) Unspecified Formulation 1959        Wt Readings from Last 3 Encounters:   01/23/23 82.1 kg (181 lb)   11/11/22 80.7 kg (178 lb)   07/20/21 91.3 kg (201 lb 4.8 oz)        BP Readings from Last 6 Encounters:   01/23/23 128/76   11/11/22 126/70   07/20/21 120/74   07/06/21 124/62   06/15/21 126/76   06/02/20 127/72        No results found for: A1C, HEMOGLOBINA1           PHYSICAL EXAM:    /76   Pulse 80   Temp 98  F (36.7  C)   Resp 18   Ht 1.575 m (5' 2\")   Wt 82.1 kg (181 lb)   SpO2 96%   BMI 33.11 kg/m           General Appearance:    Alert, cooperative, no distress   Eyes:   No scleral icterus or conjunctival irritation       Lungs:     Clear to auscultation bilaterally, respirations unlabored, no wheezes or crackles   Heart:    Regular rate and rhythm,  No murmur   Abdomen:    Abdomen is soft there is no distention, palpation of the left side of the abdomen generally does not elicit tenderness but as I get more to the midline especially in the epigastrium there starts to be more tenderness which is worse in the right upper quadrant.  There is some tenderness in the right lower quadrant.  There is no rebound tenderness no guarding.     Extremities:  No edema, no joint swelling or redness, no evidence of any injuries   Skin:  No concerning skin findings, no suspicious moles, no rashes   Neurologic:  On gross examination there is no motor or sensory deficit.  Patient walks with a normal gait                                       Answers for HPI/ROS submitted by the patient on 1/19/2023  How many servings of fruits and vegetables do you eat daily?: 2-3  On average, how many sweetened beverages do you drink each day (Examples: soda, juice, sweet tea, etc.  Do " NOT count diet or artificially sweetened beverages)?: 1  How many minutes a day do you exercise enough to make your heart beat faster?: 30 to 60  How many days a week do you exercise enough to make your heart beat faster?: 4  How many days per week do you miss taking your medication?: 0  What is the reason for your visit today?: Lower right abdominal pain  When did your symptoms begin?: 3-7 days ago  How would you describe these symptoms?: Moderate  Are your symptoms:: Staying the same  Have you had these symptoms before?: Yes

## 2023-01-25 DIAGNOSIS — R10.11 ABDOMINAL PAIN, RIGHT UPPER QUADRANT: Primary | ICD-10-CM

## 2023-01-30 ENCOUNTER — HOSPITAL ENCOUNTER (OUTPATIENT)
Dept: CT IMAGING | Facility: CLINIC | Age: 63
Discharge: HOME | End: 2023-01-30
Attending: FAMILY MEDICINE | Admitting: FAMILY MEDICINE
Payer: COMMERCIAL

## 2023-01-30 DIAGNOSIS — R10.11 ABDOMINAL PAIN, RIGHT UPPER QUADRANT: ICD-10-CM

## 2023-01-30 PROCEDURE — 74176 CT ABD & PELVIS W/O CONTRAST: CPT

## 2023-02-02 ENCOUNTER — MYC MEDICAL ADVICE (OUTPATIENT)
Dept: FAMILY MEDICINE | Facility: CLINIC | Age: 63
End: 2023-02-02
Payer: COMMERCIAL

## 2023-02-02 NOTE — TELEPHONE ENCOUNTER
Left message to return call. Please route to RN queue when pt returns call to triage.    How is her pain? Where is her pain at?    Any blood in urine?    Any UTI symptoms?      SHAW Dacosta, RN  [facility]

## 2023-02-03 NOTE — TELEPHONE ENCOUNTER
2nd attempt.  Left message to return call. Please route to RN queue when pt returns call to be triaged.    PILO DacostaN, RN  [facility]

## 2023-02-09 NOTE — TELEPHONE ENCOUNTER
Pt read [employer] message 2/6/2023  4:58 PM and was directed to return call.   -Still have not received response from patient.   -Closing encounter since no response for 1 week.     VIC Petty

## 2023-04-17 ENCOUNTER — TELEPHONE (OUTPATIENT)
Dept: PULMONOLOGY | Facility: CLINIC | Age: 63
End: 2023-04-17
Payer: COMMERCIAL

## 2023-04-17 DIAGNOSIS — J44.1 OBSTRUCTIVE CHRONIC BRONCHITIS WITH EXACERBATION (H): Primary | ICD-10-CM

## 2023-04-17 RX ORDER — AZITHROMYCIN 250 MG/1
TABLET, FILM COATED ORAL
Qty: 6 TABLET | Refills: 0 | Status: SHIPPED | OUTPATIENT
Start: 2023-04-17 | End: 2023-04-22

## 2023-04-17 RX ORDER — PREDNISONE 20 MG/1
TABLET ORAL
Qty: 14 TABLET | Refills: 0 | Status: SHIPPED | OUTPATIENT
Start: 2023-04-17 | End: 2023-06-08

## 2023-04-17 NOTE — TELEPHONE ENCOUNTER
Phone call from patient.  States she developed cold-like symptoms last Saturday, April 15.  Coughing with chest congestion,  Greenish nasal drainage as well as phlegm, no fever, sob.     Will send prescription for her last action plan from Dr. Veliz:  Prednisone 40mg daily x 7 days AND Zpack.

## 2023-05-10 ENCOUNTER — TRANSFERRED RECORDS (OUTPATIENT)
Dept: HEALTH INFORMATION MANAGEMENT | Facility: CLINIC | Age: 63
End: 2023-05-10
Payer: COMMERCIAL

## 2023-05-18 ENCOUNTER — TRANSFERRED RECORDS (OUTPATIENT)
Dept: HEALTH INFORMATION MANAGEMENT | Facility: CLINIC | Age: 63
End: 2023-05-18
Payer: COMMERCIAL

## 2023-05-30 ENCOUNTER — TRANSFERRED RECORDS (OUTPATIENT)
Dept: HEALTH INFORMATION MANAGEMENT | Facility: CLINIC | Age: 63
End: 2023-05-30
Payer: COMMERCIAL

## 2023-05-31 ENCOUNTER — TELEPHONE (OUTPATIENT)
Dept: PULMONOLOGY | Facility: CLINIC | Age: 63
End: 2023-05-31
Payer: COMMERCIAL

## 2023-05-31 DIAGNOSIS — R05.8 PRODUCTIVE COUGH: ICD-10-CM

## 2023-05-31 DIAGNOSIS — J44.1 OBSTRUCTIVE CHRONIC BRONCHITIS WITH EXACERBATION (H): ICD-10-CM

## 2023-05-31 DIAGNOSIS — J44.1 OBSTRUCTIVE CHRONIC BRONCHITIS WITH EXACERBATION (H): Primary | ICD-10-CM

## 2023-05-31 RX ORDER — PREDNISONE 20 MG/1
40 TABLET ORAL DAILY
Qty: 14 TABLET | Refills: 0 | Status: SHIPPED | OUTPATIENT
Start: 2023-05-31 | End: 2023-06-07

## 2023-05-31 RX ORDER — AZITHROMYCIN 250 MG/1
TABLET, FILM COATED ORAL
Qty: 6 TABLET | Refills: 0 | Status: SHIPPED | OUTPATIENT
Start: 2023-05-31 | End: 2023-06-05

## 2023-05-31 NOTE — TELEPHONE ENCOUNTER
Amy called today in regards to having no improvement of cough, congestion or phlegm from her last action plan on 4/17/23. She reports her phlegm is now green to yellow in color. In the mornings, she is seeing some blood tinged phlegm. She reports amount of blood is very small, size of eraser head, and it only happens in the morning. Will send in second round of prednisone and Z-pack per Dr. Veliz's orders. Also, set her up to see NP next Thursday after action plan complete. She will call back if the action plan works. Advised her to go to ED if she is continues to have bloody secretions.

## 2023-06-06 NOTE — PROGRESS NOTES
Pulmonary Clinic Follow-Up          Assessment/Plan:       Amy Sands is a 62 year old smoker with history of asthma/chronic bronchitis, chronic TB, RA, fibromyalgia, presenting today for follow up and symptoms of exacerbation.    Severe persistent asthma  PFT on 8/8/2022 showed normal spirometry, normal lung volumes, normal diffusion capacity without clinically significant bronchodilator response.  CT chest on 7/30/2021 that showed no evidence of interstitial lung disease that may be associated with rheumatoid arthritis.   Hard time w/ her symptoms during the summer when it is humid & in the winter when exposed to cold air.   She presents today with worsening respiratory symptoms, requiring action plan x2.  Having chest congestion, MILIAN, thick mucous production.  Plan:  - start extended prednisone taper  - start Duonebs 3-4 times daily, with flutter valve.  Then back off to PRN once feeling improved.  Provided patient flutter valve today and educated her on use with neb machine.  - start Mucinex 600mg ER once-twice daily, with full glass of water.  - continue Symbicort (budesonide/formoterol) 160/4.5, two puffs BID, rinse/gargle after use.  - continue Spiriva (tiotropium), one inhalation daily.  - continue Albuterol inhaler PRN  - she is UTD with COVID vaccines and bivalent booster, annual influenza vaccine, and pneumococcal vaccine.  - Action plan:  Prednisone 40mg x7 days + azithromycin x5 days.    Rheumatoid arthritis   Followed at [facility], by Dr Banegas.  Last visit 5/3/23.  No evidence of ILD as of 7/2021 chest CT.  On methotrexate 25mg weekly.  Plan:  - repeat chest CT as she has not had imaging in two years, to monitor for ILD.  Patient having respiratory symptoms as above without resolution of action plans.  We will call or message with results.    Follow-up  - 6 months, sooner if she does not improve with above interventions      Cristiane Marcum, CNP  Pulmonary Medicine  [facility]  [facility]  [phone]         CC:     Asthma follow-up/exacerbation symptoms     HPI:     Amy Sands is a 62 year old smoker with history of asthma/chronic bronchitis, chronic TB, photokeratitis, presenting today for follow up and symptoms of exacerbation.     Since last visit (11/2022, Dr Veliz), she required her action plan x2, starting 4/17/23.  Started with URI.  She has hx of bronchitis after URI dating back to childhood.  She is reporting green/yellow thick phlegm, productive cough, mornings are the worst.  Exhaustion, SOB.  No fever, chest pain, or night sweats.   Did not feel any better after first action plan.  Just finished second action plan, no difference.  Now using Albuterol inhaler twice/daily.  When feeling well- once/week.  Albuterol nebulized every morning now, does find benefit- can get sputum out.       ROS:     6-point ROS performed and is negative aside from those listed in HPI.     Medical history:     Past Medical History:   Diagnosis Date     Arthritis      Arthritis      Chronic bronchitis (H)      Chronic bronchitis (H)      Fibromyalgia      Fibromyalgia      History of anesthesia complications     HARD WAKEUP AND LOW BP     History of anesthesia complications     HARD WAKEUP AND LOW BP     Kidney stone      Kidney stone      PONV (postoperative nausea and vomiting)     motion sickness     PONV (postoperative nausea and vomiting)     motion sickness     Rosacea      Rosacea      Rotator cuff tendonitis, right      Rotator cuff tendonitis, right      Sleep apnea     DOESNT USE CPAP     Sleep apnea     DOESNT USE CPAP     Past Surgical History:   Procedure Laterality Date     ARTHROSCOPY KNEE Left      COMBINED CYSTOSCOPY, INSERT STENT URETER(S) Right 3/25/2019    Procedure: CYSTOSCOPY, RIGHT URETEROSCOPY STENT INSERTION AND RETROGRADE PYELOGRAM;  Surgeon: Kenneth Lopez MD;  Location: [facility] OR;  Service: Urology     HYSTERECTOMY       OOPHORECTOMY        OTHER SURGICAL HISTORY      lysis of adhesions for bowel obstruction x 2     RELEASE CARPAL TUNNEL Bilateral      ZZC TOTAL KNEE ARTHROPLASTY Left 1/7/2016    Procedure: TOTAL KNEE  ARTHROPLASTY;  Surgeon: Boubacar Cortez MD;  Location: [facility];  Service: Orthopedics     Social History     Tobacco Use     Smoking status: Never     Passive exposure: Yes     Smokeless tobacco: Never   Vaping Use     Vaping status: Never Used   Substance Use Topics     Alcohol use: No     Drug use: No     Current Outpatient Medications   Medication     albuterol (PROAIR HFA) 90 mcg/actuation inhaler     budesonide-formoterol (SYMBICORT) 160-4.5 MCG/ACT Inhaler     folic acid (FOLVITE) 1 MG tablet     ipratropium - albuterol 0.5 mg/2.5 mg/3 mL (DUONEB) 0.5-2.5 (3) MG/3ML neb solution     methotrexate 2.5 MG tablet     predniSONE (DELTASONE) 10 MG tablet     pregabalin (LYRICA) 75 MG capsule     tiotropium (SPIRIVA HANDIHALER) 18 MCG inhaled capsule     predniSONE (DELTASONE) 20 MG tablet     No current facility-administered medications for this visit.        Physical Exam:     /80 (BP Location: Left arm, Patient Position: Chair, Cuff Size: Adult Large)   Pulse 74   Temp 98.2  F (36.8  C) (Oral)   Wt 83 kg (183 lb)   SpO2 96%   BMI 33.47 kg/m    Gen: adult female , appears in NAD  HEENT: clear conjunctivae, moist mucous membranes  CV: RRR, no M/G/R  Resp: CTAB, diminished air movement throughout.  Respirations even and unlabored.  On RA.  Dry cough throughout exam.  Skin: no apparent rashes on visible skin  Ext: no cyanosis, clubbing or edema  Neuro: alert and answering questions appropriately       Data:       EXAM: CT CHEST HI-RESOLUTION WO CONTRAST  LOCATION: [facility]  DATE/TIME: 7/30/2021 8:04 AM  INDICATION: Dyspnea, chronic, unclear etiology  COMPARISON: 10/17/2012  TECHNIQUE: High resolution images were obtained through the chest during inspiration with select expiratory views.  Prone imaging was performed. Multiplanar reformats were obtained. Dose reduction techniques were used.  CONTRAST: None.  FINDINGS:   LUNGS AND PLEURA: No interstitial thickening, honeycombing, fibrosis, bronchiectasis, air trapping or groundglass opacities. No pleural effusion. A few stable benign scattered pulmonary nodules measuring 0.3 cm or less.  MEDIASTINUM/AXILLAE: Normal caliber aorta. No pericardial effusion.  CORONARY ARTERY CALCIFICATION: Mild.  UPPER ABDOMEN: Hepatic steatosis. Cholelithiasis.  MUSCULOSKELETAL: Mild degenerative changes of the spine.                                                            IMPRESSION:   1.  No evidence of interstitial lung disease.  2.  Hepatic steatosis.  3.  Cholelithiasis.      PFTs 8/2022:  The FVC, FEV1, FEV1/FVC ratio and OVB23-68% are within normal limits.  The inspiratory flow rates are within normal limits.  Lung volumes are within normal limits.  Following administration of bronchodilators, there is no significant response.  The   diffusing capacity is normal.   The results are within normal limits.   IMPRESSION:   Normal Pulmonary Function

## 2023-06-08 ENCOUNTER — HOSPITAL ENCOUNTER (OUTPATIENT)
Dept: CT IMAGING | Facility: CLINIC | Age: 63
Discharge: HOME | End: 2023-06-08
Attending: NURSE PRACTITIONER | Admitting: NURSE PRACTITIONER
Payer: COMMERCIAL

## 2023-06-08 ENCOUNTER — OFFICE VISIT (OUTPATIENT)
Dept: PULMONOLOGY | Facility: CLINIC | Age: 63
End: 2023-06-08
Payer: COMMERCIAL

## 2023-06-08 VITALS
HEART RATE: 74 BPM | BODY MASS INDEX: 33.47 KG/M2 | OXYGEN SATURATION: 96 % | SYSTOLIC BLOOD PRESSURE: 132 MMHG | TEMPERATURE: 98.2 F | DIASTOLIC BLOOD PRESSURE: 80 MMHG | WEIGHT: 183 LBS

## 2023-06-08 DIAGNOSIS — M06.9 RHEUMATOID ARTHRITIS, INVOLVING UNSPECIFIED SITE, UNSPECIFIED WHETHER RHEUMATOID FACTOR PRESENT (H): ICD-10-CM

## 2023-06-08 DIAGNOSIS — J45.51 SEVERE PERSISTENT ASTHMA WITH ACUTE EXACERBATION (H): ICD-10-CM

## 2023-06-08 DIAGNOSIS — J45.51 SEVERE PERSISTENT ASTHMA WITH ACUTE EXACERBATION (H): Primary | ICD-10-CM

## 2023-06-08 DIAGNOSIS — R06.09 DYSPNEA ON EXERTION: ICD-10-CM

## 2023-06-08 PROCEDURE — 71250 CT THORAX DX C-: CPT

## 2023-06-08 PROCEDURE — 99214 OFFICE O/P EST MOD 30 MIN: CPT | Performed by: NURSE PRACTITIONER

## 2023-06-08 RX ORDER — IPRATROPIUM BROMIDE AND ALBUTEROL SULFATE 2.5; .5 MG/3ML; MG/3ML
1 SOLUTION RESPIRATORY (INHALATION) EVERY 6 HOURS PRN
Qty: 90 ML | Refills: 4 | Status: SHIPPED | OUTPATIENT
Start: 2023-06-08

## 2023-06-08 RX ORDER — PREDNISONE 10 MG/1
TABLET ORAL
Qty: 50 TABLET | Refills: 0 | Status: SHIPPED | OUTPATIENT
Start: 2023-06-08 | End: 2023-06-28

## 2023-06-08 NOTE — PATIENT INSTRUCTIONS
It was a pleasure to see you in clinic today.   Here is what we discussed:    Start prednisone extended taper:  40mg x5 days, 30mg x5 days, 20mg x5 days, 10mg x5 days.    Start Duonebs inhaled, 3-4 times daily while you are acutely ill, then decrease to just as needed.  Use the flutter valve with your nebulizer.  Start mucinex 600mg ER once-twice daily, with full glass of water.  We will repeat your chest CT, I will call or message with results.  Continue Symbicort two puffs twice daily, rinse/gargle after use.  Continue Spiriva one puff daily.  Call us with any change or worsening of your breathing.  Follow-up in 6 months.    Cristiane Marcum, CNP  Pulmonary Medicine  [facility]  [phone]

## 2023-07-10 ENCOUNTER — HOSPITAL ENCOUNTER (OUTPATIENT)
Dept: CT IMAGING | Facility: CLINIC | Age: 63
Discharge: HOME | End: 2023-07-10
Attending: PHYSICIAN ASSISTANT | Admitting: PHYSICIAN ASSISTANT
Payer: COMMERCIAL

## 2023-07-10 ENCOUNTER — NURSE TRIAGE (OUTPATIENT)
Dept: FAMILY MEDICINE | Facility: CLINIC | Age: 63
End: 2023-07-10

## 2023-07-10 ENCOUNTER — OFFICE VISIT (OUTPATIENT)
Dept: FAMILY MEDICINE | Facility: CLINIC | Age: 63
End: 2023-07-10
Payer: COMMERCIAL

## 2023-07-10 VITALS
DIASTOLIC BLOOD PRESSURE: 70 MMHG | OXYGEN SATURATION: 97 % | SYSTOLIC BLOOD PRESSURE: 124 MMHG | TEMPERATURE: 98.4 F | RESPIRATION RATE: 16 BRPM | WEIGHT: 180 LBS | HEART RATE: 63 BPM | BODY MASS INDEX: 32.92 KG/M2

## 2023-07-10 DIAGNOSIS — R10.84 ABDOMINAL PAIN, GENERALIZED: Primary | ICD-10-CM

## 2023-07-10 DIAGNOSIS — R10.84 ABDOMINAL PAIN, GENERALIZED: ICD-10-CM

## 2023-07-10 DIAGNOSIS — K80.20 CALCULUS OF GALLBLADDER WITHOUT CHOLECYSTITIS WITHOUT OBSTRUCTION: ICD-10-CM

## 2023-07-10 LAB
ALBUMIN UR-MCNC: NEGATIVE MG/DL
APPEARANCE UR: CLEAR
BILIRUB UR QL STRIP: NEGATIVE
COLOR UR AUTO: YELLOW
GLUCOSE UR STRIP-MCNC: NEGATIVE MG/DL
HGB UR QL STRIP: NEGATIVE
KETONES UR STRIP-MCNC: NEGATIVE MG/DL
LEUKOCYTE ESTERASE UR QL STRIP: NEGATIVE
NITRATE UR QL: NEGATIVE
PH UR STRIP: 5.5 [PH] (ref 5–8)
SP GR UR STRIP: 1.02 (ref 1–1.03)
UROBILINOGEN UR STRIP-ACNC: 0.2 E.U./DL

## 2023-07-10 PROCEDURE — 99213 OFFICE O/P EST LOW 20 MIN: CPT | Performed by: PHYSICIAN ASSISTANT

## 2023-07-10 PROCEDURE — 81003 URINALYSIS AUTO W/O SCOPE: CPT | Performed by: PHYSICIAN ASSISTANT

## 2023-07-10 PROCEDURE — 74177 CT ABD & PELVIS W/CONTRAST: CPT

## 2023-07-10 PROCEDURE — 250N000011 HC RX IP 250 OP 636: Mod: JZ | Performed by: PHYSICIAN ASSISTANT

## 2023-07-10 RX ORDER — IOPAMIDOL 755 MG/ML
89 INJECTION, SOLUTION INTRAVASCULAR ONCE
Status: COMPLETED | OUTPATIENT
Start: 2023-07-10 | End: 2023-07-10

## 2023-07-10 RX ADMIN — IOPAMIDOL 89 ML: 755 INJECTION, SOLUTION INTRAVENOUS at 11:41

## 2023-07-10 ASSESSMENT — ENCOUNTER SYMPTOMS
BLOOD IN STOOL: 0
DIARRHEA: 1
VOMITING: 0
ABDOMINAL PAIN: 1
FEVER: 0
NAUSEA: 1

## 2023-07-10 NOTE — TELEPHONE ENCOUNTER
atypical description for diverticulitis. She hill wait to be seen in office from what I know of her symptoms    Mario Babin MD

## 2023-07-10 NOTE — PROGRESS NOTES
Patient presents with:  Abdominal Pain: Has had rt lower abd pain also some on left side  for over 1  week has hx of bowel  colon problems and resection      Clinical Decision Making:  Patient experiencing generalized abdominal discomfort.  UA is negative for signs of infection or blood.  CT shows no signs of nephrolithiasis, obstruction, perforation, or diverticulitis.  Incidental finding of cholelithiasis, no evidence of cholecystitis.  This could be contributing to her abdominal pain, but given its generalized nature could also be viral gastroenteritis or indigestion.  Recommend watchful waiting.  We discussed and I did offer general surgery consultation outpatient, but patient would rather watchful wait for now.      ICD-10-CM    1. Abdominal pain, generalized  R10.84 UA Macroscopic with reflex to Microscopic and Culture - Clinic Collect     CT Abdomen Pelvis w Contrast      2. Calculus of gallbladder without cholecystitis without obstruction  K80.20           Patient Instructions   1. No current finding to explain your pain on CT.   2. Your urine test is negative.   3. This could be a viral gastroenteritis, or indigestion.   4. I recommend taking Tylenol for pain management. Follow up if symptoms worse or fail to improve over the next 1 week.       HPI:  Amy Sands is a 62 year old female with PMHx of obesity, who presents today complaining of right lower abdominal pain and mild left lower abdominal pain x 1 week.  Patient has previously had her appendix out.  She had abdominal resection due to previous obstructions.  She knows that she has diverticulosis, but no known history of diverticulitis.  No recent travel.  No recent antibiotics.   she has had looser stools than normal and some nausea.  No known fevers.  Patient has a history of kidney stones, but no recent urinary symptoms.    History obtained from the patient.    Problem List:  2021-07: Rheumatoid arthritis involving right wrist, unspecified    whether rheumatoid factor present (H)  2020-06: Obesity (BMI 35.0-39.9) with comorbidity (H)  2019-03: Urinary urgency  2019-03: Right flank discomfort  2019-01: Pre-op exam  2018-06: Productive cough  2018-06: TB lung, latent  2018-06: Visit for screening mammogram  2016-01: Total knee replacement status  2016-01: Primary osteoarthritis of left knee  Right Rotator Cuff Tendonitis  Fibromyalgia  Strained Right Pectoralis Major Muscle  Benign Pigmented Nevus  Rosacea  Lentigo  Chronic Bronchitis With Acute Exacerbation  Chest Pain  Joint Pain, Localized In The Knee  Hydronephrosis with urinary obstruction due to ureteral calculus      Past Medical History:   Diagnosis Date     Arthritis      Arthritis      Chronic bronchitis (H)      Chronic bronchitis (H)      Fibromyalgia      Fibromyalgia      History of anesthesia complications     HARD WAKEUP AND LOW BP     History of anesthesia complications     HARD WAKEUP AND LOW BP     Kidney stone      Kidney stone      PONV (postoperative nausea and vomiting)     motion sickness     PONV (postoperative nausea and vomiting)     motion sickness     Rosacea      Rosacea      Rotator cuff tendonitis, right      Rotator cuff tendonitis, right      Sleep apnea     DOESNT USE CPAP     Sleep apnea     DOESNT USE CPAP       Social History     Tobacco Use     Smoking status: Never     Passive exposure: Yes     Smokeless tobacco: Never   Substance Use Topics     Alcohol use: No       Review of Systems   Constitutional: Negative for fever.   Gastrointestinal: Positive for abdominal pain, diarrhea and nausea. Negative for blood in stool and vomiting.       Vitals:    07/10/23 1009   BP: 124/70   Pulse: 63   Resp: 16   Temp: 98.4  F (36.9  C)   TempSrc: Oral   SpO2: 97%   Weight: 81.6 kg (180 lb)       Physical Exam  Vitals and nursing note reviewed.   Constitutional:       General: She is not in acute distress.     Appearance: She is not toxic-appearing or diaphoretic.   HENT:       Head: Normocephalic and atraumatic.      Right Ear: External ear normal.      Left Ear: External ear normal.   Eyes:      Conjunctiva/sclera: Conjunctivae normal.   Cardiovascular:      Rate and Rhythm: Normal rate and regular rhythm.      Heart sounds: No murmur heard.  Pulmonary:      Effort: Pulmonary effort is normal. No respiratory distress.   Abdominal:      General: Abdomen is flat. There is no distension.      Palpations: Abdomen is soft.      Tenderness: There is abdominal tenderness. There is right CVA tenderness and guarding. There is no left CVA tenderness or rebound.      Hernia: No hernia is present.   Neurological:      Mental Status: She is alert.   Psychiatric:         Mood and Affect: Mood normal.         Behavior: Behavior normal.         Thought Content: Thought content normal.         Judgment: Judgment normal.         Results:  Results for orders placed or performed during the hospital encounter of 07/10/23   CT Abdomen Pelvis w Contrast     Status: None    Narrative    EXAM: CT ABDOMEN PELVIS W CONTRAST  LOCATION: [facility]  DATE: 7/10/2023    INDICATION:  Abdominal pain, generalized  COMPARISON: 01/30/2023.   TECHNIQUE: CT scan of the abdomen and pelvis was performed following injection of IV contrast. Multiplanar reformats were obtained. Dose reduction techniques were used.  CONTRAST: Isovue 370 89ml    FINDINGS:   LOWER CHEST: Normal.    HEPATOBILIARY: Cholelithiasis.     PANCREAS: Normal.    SPLEEN: Normal.    ADRENAL GLANDS: Normal.    KIDNEYS/BLADDER:  No hydronephrosis or hydroureter. No ureteral calculi. Normal urinary bladder.    BOWEL:  Small bowel suture line in the right lower quadrant. Diverticulosis of the colon. No acute inflammatory change. No obstruction.  The appendix is not identified.    LYMPH NODES: Normal.    VASCULATURE: Normal.     PELVIC ORGANS: Hysterectomy.     MUSCULOSKELETAL: Fat-containing ventral abdominal wall hernia.      Impression     IMPRESSION:   1.  Colonic diverticulosis. No diverticulitis.  2.  Cholelithiasis.   Results for orders placed or performed in visit on 07/10/23   UA Macroscopic with reflex to Microscopic and Culture - Clinic Collect     Status: Normal    Specimen: Urine, Clean Catch   Result Value Ref Range    Color Urine Yellow Colorless, Straw, Light Yellow, Yellow    Appearance Urine Clear Clear    Glucose Urine Negative Negative mg/dL    Bilirubin Urine Negative Negative    Ketones Urine Negative Negative mg/dL    Specific Gravity Urine 1.020 1.005 - 1.030    Blood Urine Negative Negative    pH Urine 5.5 5.0 - 8.0    Protein Albumin Urine Negative Negative mg/dL    Urobilinogen Urine 0.2 0.2, 1.0 E.U./dL    Nitrite Urine Negative Negative    Leukocyte Esterase Urine Negative Negative    Narrative    Microscopic not indicated       At the end of the encounter, I discussed results, diagnosis, medications. Discussed red flags for immediate return to clinic/ER, as well as indications for follow up if no improvement. Patient understood and agreed to plan. Patient was stable for discharge.

## 2023-07-10 NOTE — TELEPHONE ENCOUNTER
Nurse Triage SBAR    Is this a 2nd Level Triage? YES, LICENSED PRACTITIONER REVIEW IS REQUIRED    Situation:   Patient complained of having lower right abdominal pain for one week and feels it's getting worst.    Background:   Diverticulosis per scan when she was seen in office back in January.  Had Colon resection and she feels the pain is in that  Area.    Assessment:   Lower right quadrant abdominal pain for about a week, which she feels is gradually getting worst.    Patient stated she had colon issues and had resection done. She feels the tenderness is in that area. But she can now feel pain from rib cage in, upper right and upper left quadrant in the last few days, although pain is worst in the lower right quadrant, especially when pressing on it. Worst with sitting and better with standing. Pain ating at 7-8/10 now.     A few hours after eating, after she feels like things are passing through that area then is when she noticed the pain worsening.   She had been doing clear liquid and broth with some crackers only, but that doesn't seem to help or make pain better.    Had diarrhea yesterday and the day before as well. Intermittently soft stools for a week now as well.   Denied bloody, black or tarry stools.  Hurts to lay down; not getting a good night sleep.  Denied vomiting but has some nausea.  Denied fever or chills.  Denied chest pain, SOB/Difficulty breathing.  Tylenol is not helping.   Heating pads the night before that didn't help.    She is wondering if her diverticulosis are acting up.    Protocol Recommended Disposition:   Go To ED/UCC Now (Or To Office With PCP Approval)    Recommendation:   Care advice given and she refused to go to ED or UCC now. Patient stated she would much rather be seen at clinic first. Explained to her that there's no appt left for the day at clinic and will route to covering provider to advise. Patient stated she is willing to be seen tomorrow at clinic as well.    OK to  leave detailed messages.    Routed to provider    Does the patient meet one of the following criteria for ADS visit consideration? 16+ years old, with an [facility] PCP     TIP  Providers, please consider if this condition is appropriate for management at one of our Acute and Diagnostic Services sites.     If patient is a good candidate, please use dotphrase <dot>triageresponse and select Refer to ADS to document.    Reason for Disposition    Constant abdominal pain lasting > 2 hours    Additional Information    Negative: Passed out (i.e., fainted, collapsed and was not responding)    Negative: Shock suspected (e.g., cold/pale/clammy skin, too weak to stand, low BP, rapid pulse)    Negative: Sounds like a life-threatening emergency to the triager    Negative: Chest pain    Negative: Pain is mainly in upper abdomen (if needed ask: 'is it mainly above the belly button?')    Negative: Abdominal pain and pregnant < 20 weeks    Negative: Abdominal pain and pregnant 20 or more weeks    Negative: SEVERE abdominal pain (e.g., excruciating)    Negative: Vomiting red blood or black (coffee ground) material    Negative: Bloody, black, or tarry bowel movements  (Exception: Chronic-unchanged black-grey bowel movements and is taking iron pills or Pepto-Bismol.)    Protocols used: ABDOMINAL PAIN - FEMALE-A-OH    PILO DacostaN, RN  [facility]

## 2023-07-10 NOTE — PATIENT INSTRUCTIONS
No current finding to explain your pain on CT.   Your urine test is negative.   This could be a viral gastroenteritis, or indigestion.   I recommend taking Tylenol for pain management. Follow up if symptoms worse or fail to improve over the next 1 week.

## 2023-07-10 NOTE — TELEPHONE ENCOUNTER
Provider Recommendation Follow Up:   Reached patient/caregiver. Informed of provider's recommendations. Patient verbalized understanding and agrees with the plan.     Patient stated that while waiting for a response back, she actually decided to go in and be seen at the [facility]. They didn't find anything and was told to monitor and follow up if not better. Patient will continue to monitor self and call back with any questions, concerns or to make an appt if needed.    PILO DacostaN, RN  [facility]

## 2023-08-09 ENCOUNTER — TRANSFERRED RECORDS (OUTPATIENT)
Dept: HEALTH INFORMATION MANAGEMENT | Facility: CLINIC | Age: 63
End: 2023-08-09
Payer: COMMERCIAL

## 2023-08-21 ENCOUNTER — OFFICE VISIT (OUTPATIENT)
Dept: FAMILY MEDICINE | Facility: CLINIC | Age: 63
End: 2023-08-21
Payer: COMMERCIAL

## 2023-08-21 VITALS
HEART RATE: 83 BPM | RESPIRATION RATE: 17 BRPM | HEIGHT: 61 IN | OXYGEN SATURATION: 97 % | TEMPERATURE: 98.4 F | DIASTOLIC BLOOD PRESSURE: 75 MMHG | BODY MASS INDEX: 34.25 KG/M2 | WEIGHT: 181.4 LBS | SYSTOLIC BLOOD PRESSURE: 127 MMHG

## 2023-08-21 DIAGNOSIS — R10.84 ABDOMINAL PAIN, GENERALIZED: ICD-10-CM

## 2023-08-21 DIAGNOSIS — K66.0 INTESTINAL ADHESIONS: ICD-10-CM

## 2023-08-21 DIAGNOSIS — K80.50 BILIARY PAIN: Primary | ICD-10-CM

## 2023-08-21 PROCEDURE — 99213 OFFICE O/P EST LOW 20 MIN: CPT | Performed by: STUDENT IN AN ORGANIZED HEALTH CARE EDUCATION/TRAINING PROGRAM

## 2023-08-21 ASSESSMENT — PAIN SCALES - GENERAL: PAINLEVEL: EXTREME PAIN (8)

## 2023-08-21 NOTE — PROGRESS NOTES
Assessment and Plan   63-year-old female with complex past medical history who presents with abdominal pain going on for the last 6 months or so.  Has been evaluated several times in this.  With no etiology found.  Several CT scans with the last one copied as below.  Only showing cholelithiasis.  I do think some of her symptoms sound consistent with biliary colic especially right upper quadrant pain that is worse after eating.  Of her her right lower quadrant pain does not seem consistent with this.  She does describe a history of significant bowel adhesions and certainly a possibility.  I recommended consultation with general surgery about the biliary pain as well as adhesions to consider cholecystectomy and laparoscopy with adhesion takedown.  I did discuss with her that this may not improve her symptoms though as I am not fully convinced these are the causes.  I placed an order for GI to further work-up chronic abdominal pain if these are unsuccessful.    1. Biliary pain  2. Intestinal adhesions  - Adult General Surg Referral; Future    3. Abdominal pain, generalized  - Adult GI  Referral - Consult Only; Future    Follow up: PRN    Options for treatment and follow-up care were reviewed with the patient and/or guardian. Amy Sands and/or guardian engaged in the decision making process and verbalized understanding of the options discussed and agreed with the final plan.    Dr. Mario Babin         HPI:   Amy Sands is a 63 year old  female who presents for:    Chief Complaint   Patient presents with    Abdominal Pain     pain in the stomach and hard to walk     Patient tells me she has had pain in her abdomen RLQ and RUQ since the beginning of the yeara bout 6 months ago.  Crampy pain. Associated with nausea and cramping. Some diarrhea yesterday bt loose this morning. New symptom. No black or red stools.  She has a Hx of adnhesions. From previous bowel surgery    EXAM: CT ABDOMEN PELVIS W  CONTRAST  LOCATION: [facility]  DATE: 7/10/2023     INDICATION:  Abdominal pain, generalized  COMPARISON: 01/30/2023.   TECHNIQUE: CT scan of the abdomen and pelvis was performed following injection of IV contrast. Multiplanar reformats were obtained. Dose reduction techniques were used.  CONTRAST: Isovue 370 89ml     FINDINGS:   LOWER CHEST: Normal.     HEPATOBILIARY: Cholelithiasis.      PANCREAS: Normal.     SPLEEN: Normal.     ADRENAL GLANDS: Normal.     KIDNEYS/BLADDER:  No hydronephrosis or hydroureter. No ureteral calculi. Normal urinary bladder.     BOWEL:  Small bowel suture line in the right lower quadrant. Diverticulosis of the colon. No acute inflammatory change. No obstruction.  The appendix is not identified.     LYMPH NODES: Normal.     VASCULATURE: Normal.      PELVIC ORGANS: Hysterectomy.      MUSCULOSKELETAL: Fat-containing ventral abdominal wall hernia.                                                                      IMPRESSION:   1.  Colonic diverticulosis. No diverticulitis.  2.  Cholelithiasis.         PMHX:     Patient Active Problem List   Diagnosis    Right Rotator Cuff Tendonitis    Fibromyalgia    Strained Right Pectoralis Major Muscle    Benign Pigmented Nevus    Rosacea    Lentigo    Chronic Bronchitis With Acute Exacerbation    Chest Pain    Joint Pain, Localized In The Knee    Primary osteoarthritis of left knee    Total knee replacement status    Productive cough    TB lung, latent    Visit for screening mammogram    Pre-op exam    Urinary urgency    Right flank discomfort    Hydronephrosis with urinary obstruction due to ureteral calculus    Obesity (BMI 35.0-39.9) with comorbidity (H)    Rheumatoid arthritis involving right wrist, unspecified whether rheumatoid factor present (H)       Social History     Tobacco Use    Smoking status: Never     Passive exposure: Yes    Smokeless tobacco: Never   Vaping Use    Vaping Use: Never used   Substance Use  "Topics    Alcohol use: No    Drug use: No       Social History     Social History Narrative    Not on file       Allergies   Allergen Reactions    Dilaudid [Hydromorphone] Nausea and Vomiting    Hydrocodone Nausea and Vomiting    Toradol [Ketorolac] Nausea and Vomiting       No results found for this or any previous visit (from the past 24 hour(s)).         Review of Systems:    ROS: 10 point ROS neg other than the symptoms noted above in the HPI.         Physical Exam:     Vitals:    08/21/23 1300   BP: 127/75   BP Location: Right arm   Patient Position: Sitting   Cuff Size: Adult Large   Pulse: 83   Resp: 17   Temp: 98.4  F (36.9  C)   TempSrc: Oral   SpO2: 97%   Weight: 82.3 kg (181 lb 6.4 oz)   Height: 1.544 m (5' 0.79\")     Body mass index is 34.52 kg/m .    General appearance: Alert, cooperative, no distress, appears stated age, obese  Head: Normocephalic, atraumatic, without obvious abnormality  Eyes: Pupils equal round, reactive.  Conjunctiva clear.  Nose: Nares normal, no drainage.  Throat: Lips, mucosa, tongue normal mucosa pink and moist  Neck: Supple, symmetric, trachea midline,  Abdomen: Soft, tender in RUQ and RLQ, nondistended.  No masses or organomegaly.            "

## 2023-09-26 ENCOUNTER — OFFICE VISIT (OUTPATIENT)
Dept: FAMILY MEDICINE | Facility: CLINIC | Age: 63
End: 2023-09-26
Payer: COMMERCIAL

## 2023-09-26 VITALS
BODY MASS INDEX: 33.99 KG/M2 | WEIGHT: 180 LBS | DIASTOLIC BLOOD PRESSURE: 76 MMHG | HEART RATE: 75 BPM | RESPIRATION RATE: 18 BRPM | SYSTOLIC BLOOD PRESSURE: 126 MMHG | TEMPERATURE: 98.6 F | HEIGHT: 61 IN

## 2023-09-26 DIAGNOSIS — M79.7 FIBROMYALGIA: ICD-10-CM

## 2023-09-26 DIAGNOSIS — Z01.818 PREOP GENERAL PHYSICAL EXAM: Primary | ICD-10-CM

## 2023-09-26 DIAGNOSIS — J45.50 SEVERE PERSISTENT ASTHMA WITHOUT COMPLICATION (H): ICD-10-CM

## 2023-09-26 DIAGNOSIS — M06.9: ICD-10-CM

## 2023-09-26 LAB
ANION GAP SERPL CALCULATED.3IONS-SCNC: 11 MMOL/L (ref 7–15)
ATRIAL RATE - MUSE: 68 BPM
BUN SERPL-MCNC: 16.8 MG/DL (ref 8–23)
CALCIUM SERPL-MCNC: 9.9 MG/DL (ref 8.8–10.2)
CHLORIDE SERPL-SCNC: 102 MMOL/L (ref 98–107)
CREAT SERPL-MCNC: 0.88 MG/DL (ref 0.51–0.95)
DEPRECATED HCO3 PLAS-SCNC: 25 MMOL/L (ref 22–29)
DIASTOLIC BLOOD PRESSURE - MUSE: NORMAL MMHG
EGFRCR SERPLBLD CKD-EPI 2021: 73 ML/MIN/1.73M2
ERYTHROCYTE [DISTWIDTH] IN BLOOD BY AUTOMATED COUNT: 13.2 % (ref 10–15)
GLUCOSE SERPL-MCNC: 96 MG/DL (ref 70–99)
HCT VFR BLD AUTO: 38.8 % (ref 35–47)
HGB BLD-MCNC: 13.4 G/DL (ref 11.7–15.7)
INTERPRETATION ECG - MUSE: NORMAL
MCH RBC QN AUTO: 31.5 PG (ref 26.5–33)
MCHC RBC AUTO-ENTMCNC: 34.5 G/DL (ref 31.5–36.5)
MCV RBC AUTO: 91 FL (ref 78–100)
P AXIS - MUSE: 47 DEGREES
PLATELET # BLD AUTO: 213 10E3/UL (ref 150–450)
POTASSIUM SERPL-SCNC: 5.3 MMOL/L (ref 3.4–5.3)
PR INTERVAL - MUSE: 140 MS
QRS DURATION - MUSE: 76 MS
QT - MUSE: 394 MS
QTC - MUSE: 418 MS
R AXIS - MUSE: -14 DEGREES
RBC # BLD AUTO: 4.25 10E6/UL (ref 3.8–5.2)
SODIUM SERPL-SCNC: 138 MMOL/L (ref 135–145)
SYSTOLIC BLOOD PRESSURE - MUSE: NORMAL MMHG
T AXIS - MUSE: 58 DEGREES
VENTRICULAR RATE- MUSE: 68 BPM
WBC # BLD AUTO: 8.9 10E3/UL (ref 4–11)

## 2023-09-26 PROCEDURE — 80048 BASIC METABOLIC PNL TOTAL CA: CPT | Performed by: FAMILY MEDICINE

## 2023-09-26 PROCEDURE — 93005 ELECTROCARDIOGRAM TRACING: CPT | Performed by: FAMILY MEDICINE

## 2023-09-26 PROCEDURE — 99214 OFFICE O/P EST MOD 30 MIN: CPT | Mod: 25 | Performed by: FAMILY MEDICINE

## 2023-09-26 PROCEDURE — 36415 COLL VENOUS BLD VENIPUNCTURE: CPT | Performed by: FAMILY MEDICINE

## 2023-09-26 PROCEDURE — 85027 COMPLETE CBC AUTOMATED: CPT | Performed by: FAMILY MEDICINE

## 2023-09-26 PROCEDURE — 93010 ELECTROCARDIOGRAM REPORT: CPT | Performed by: INTERNAL MEDICINE

## 2023-09-26 ASSESSMENT — PAIN SCALES - GENERAL: PAINLEVEL: NO PAIN (0)

## 2023-09-26 NOTE — COMMUNITY RESOURCES LIST (ENGLISH)
09/26/2023    [employer] [place] Source Audio  N/A  For questions about this resource list or additional care needs, please contact your primary care clinic or care manager.  Phone: [phone]   Email: N/A   Address: [address] [place] [id number]   Hours: N/A        Financial Stability       Utility payment assistance  1  [place] [employer] Commission - [place]'s Telephone Assistance Plan (TAP) and Federal Lifeline and Affordable Connectivity Program (ACP) Distance: 0.19 miles      Phone/Virtual   [address] [place] [address]  Language: English  Fees: Free   Phone: (548) 588-5600 Email: consumer.puc@[facility]. Website: https://[place].gov/puc/consumers/telephone/     2  Community Action Partnership (CAP) [facility] [place] - Energy Assistance Distance: 1.34 miles      Phone/Virtual   [address] [place] [address]  Language: English, Hmong, Bangladeshi, Palauan  Hours: Mon - Fri 8:00 AM - 12:00 PM , Mon - Fri 1:00 PM - 4:30 PM  Fees: Free   Phone: (847) 281-2610 Email: lorna@caprw.org Website: http://www.caprw.org          Important Numbers & Websites       Emergency Services   911  City Services   311  Poison Control   (894) 326-3444  Suicide Prevention Lifeline   (496) 772-2626 (TALK)  Child Abuse Hotline   (719) 913-6820 (4-A-Child)  Sexual Assault Hotline   (615) 509-6179 (HOPE)  National Runaway Safeline   (103) 335-9887 (RUNAWAY)  All-Options Talkline   (417) 618-2289  Substance Abuse Referral   (352) 609-5122 (HELP)

## 2023-09-26 NOTE — PROGRESS NOTES
[facility]  [address]  [facility] [address]  Phone: [phone]  Fax: [phone]  Primary Provider: Codie Valle  Pre-op Performing Provider: CODIE VALLE      PREOPERATIVE EVALUATION:  Today's date: 9/26/2023    Amy is a 63 year old female who presents for a preoperative evaluation.          Surgical Information:  Surgery/Procedure: left wrist repair- joint repair  Surgery Location: [facility]  Surgeon: Dr. Acharya  Surgery Date: 10/3/23  Time of Surgery: tbd  Where patient plans to recover: At home with family  Fax number for surgical facility: [phone]    Assessment & Plan     The proposed surgical procedure is considered INTERMEDIATE risk.    Amy was seen today for recheck medication and pre-op exam.    Diagnoses and all orders for this visit:    Preop general physical exam  -     CBC with platelets; Future  -     Basic metabolic panel  (Ca, Cl, CO2, Creat, Gluc, K, Na, BUN); Future  -     EKG 12-lead, tracing only  -     CBC with platelets  -     Basic metabolic panel  (Ca, Cl, CO2, Creat, Gluc, K, Na, BUN)    Rheumatoid arthritis of left wrist (H)    Fibromyalgia    Simple chronic bronchitis (H)         - No identified additional risk factors other than previously addressed    Antiplatelet or Anticoagulation Medication Instructions:   - Patient is on no antiplatelet or anticoagulation medications.    Additional Medication Instructions:  Patient is to take all scheduled medications on the day of surgery    RECOMMENDATION:  APPROVAL GIVEN to proceed with proposed procedure, without further diagnostic evaluation.      Subjective       HPI related to upcoming procedure:     She is chronic arthritis changes due to rheumatoid arthritis in the left wrist. She had surgical procedure. Patient of a similar surgical procedure on the right wrist with success in the past for a similar indication.    She has fibromyalgia currently pain symptoms controlled.    She has  what has been classified by pulmonology is severe persistent asthma. She is using controller medication at this point in time there is no breathing difficulties. Her asthma is being controlled by her current regiment. She has no signs or symptoms of an acute infection.    She reports having history of difficulty waking from anesthesia but is otherwise not had more significant reactions. She is a family history of a blood clot in her brother, there is no known genetic clotting disorder. Patient has not had any history of blood clots or bleeding unexpectedly.    She denies any signs or symptoms of an acute infection.    She did have a fall today and caused a mild injury to her right wrist. She sustained a few scrapes.      9/19/2023     1:24 PM   Preop Questions   1. Have you ever had a heart attack or stroke? No   2. Have you ever had surgery on your heart or blood vessels, such as a stent placement, a coronary artery bypass, or surgery on an artery in your head, neck, heart, or legs? No   3. Do you have chest pain with activity? No   4. Do you have a history of  heart failure? No   5. Do you currently have a cold, bronchitis or symptoms of other infection? No   6. Do you have a cough, shortness of breath, or wheezing? No   7. Do you or anyone in your family have previous history of blood clots? YES - no personal history brother with PE, no known genetic condition   8. Do you or does anyone in your family have a serious bleeding problem such as prolonged bleeding following surgeries or cuts? No   9. Have you ever had problems with anemia or been told to take iron pills? No   10. Have you had any abnormal blood loss such as black, tarry or bloody stools, or abnormal vaginal bleeding? No   11. Have you ever had a blood transfusion? No   12. Are you willing to have a blood transfusion if it is medically needed before, during, or after your surgery? Yes   13. Have you or any of your relatives ever had problems with  anesthesia? YES - hard time waking, no other significant concerns   14. Do you have sleep apnea, excessive snoring or daytime drowsiness? No   15. Do you have any artifical heart valves or other implanted medical devices like a pacemaker, defibrillator, or continuous glucose monitor? No   16. Do you have artificial joints? YES - both knees and right wrist   17. Are you allergic to latex? No       Health Care Directive:  Patient has a Health Care Directive on file      Preoperative Review of :   reviewed - controlled substances reflected in medication list.    Status of Chronic Conditions:  See problem list for active medical problems.  Problems all longstanding and stable, except as noted/documented.  See ROS for pertinent symptoms related to these conditions.    Review of Systems  Complete review of systems is obtained.  Other than the specific considerations noted above complete review of systems is negative.      Patient Active Problem List    Diagnosis Date Noted    Rheumatoid arthritis involving right wrist, unspecified whether rheumatoid factor present (H) 07/06/2021     Priority: Medium    Obesity (BMI 35.0-39.9) with comorbidity (H) 06/02/2020     Priority: Medium    Hydronephrosis with urinary obstruction due to ureteral calculus      Priority: Medium    Urinary urgency 03/20/2019     Priority: Medium    Right flank discomfort 03/20/2019     Priority: Medium    Pre-op exam 01/11/2019     Priority: Medium    Fibromyalgia      Priority: Medium     Created by Conversion        Productive cough 06/14/2018     Priority: Medium    TB lung, latent 06/14/2018     Priority: Medium    Visit for screening mammogram 06/14/2018     Priority: Medium    Right Rotator Cuff Tendonitis      Priority: Medium     Created by Conversion  Replacement Utility updated for latest IMO load        Benign Pigmented Nevus      Priority: Medium     Created by Conversion  Replacement Utility updated for latest IMO load        Total  knee replacement status 01/09/2016     Priority: Medium    Primary osteoarthritis of left knee 01/07/2016     Priority: Medium    Chronic Bronchitis With Acute Exacerbation      Priority: Medium     Created by Conversion        Strained Right Pectoralis Major Muscle      Priority: Medium     Created by Conversion        Rosacea      Priority: Medium     Created by Conversion        Lentigo      Priority: Medium     Created by Conversion        Chest Pain      Priority: Medium     Created by Conversion        Joint Pain, Localized In The Knee      Priority: Medium     Created by Conversion          Past Medical History:   Diagnosis Date    Arthritis     Arthritis     Benign Pigmented Nevus     Created by Conversion  Replacement Utility updated for latest IMO load       Chest Pain     Created by Conversion       Chronic bronchitis (H)     Chronic bronchitis (H)     Chronic Bronchitis With Acute Exacerbation     Created by Conversion       Fibromyalgia     Fibromyalgia     History of anesthesia complications     HARD WAKEUP AND LOW BP    History of anesthesia complications     HARD WAKEUP AND LOW BP    Hydronephrosis with urinary obstruction due to ureteral calculus     Joint Pain, Localized In The Knee     Created by Conversion       Kidney stone     Kidney stone     Obesity (BMI 35.0-39.9) with comorbidity (H) 06/02/2020    PONV (postoperative nausea and vomiting)     motion sickness    PONV (postoperative nausea and vomiting)     motion sickness    Pre-op exam 01/11/2019    Primary osteoarthritis of left knee 01/07/2016    Productive cough 06/14/2018    Rheumatoid arthritis involving right wrist, unspecified whether rheumatoid factor present (H) 07/06/2021    Right flank discomfort 03/20/2019    Right Rotator Cuff Tendonitis     Created by Conversion  Replacement Utility updated for latest IMO load       Rosacea     Rosacea     Rotator cuff tendonitis, right     Rotator cuff tendonitis, right     Sleep apnea      DOESNT USE CPAP    Sleep apnea     DOESNT USE CPAP    TB lung, latent 06/14/2018    Total knee replacement status 01/09/2016    Urinary urgency 03/20/2019    Visit for screening mammogram 06/14/2018     Past Surgical History:   Procedure Laterality Date    ARTHROSCOPY KNEE Left     COMBINED CYSTOSCOPY, INSERT STENT URETER(S) Right 3/25/2019    Procedure: CYSTOSCOPY, RIGHT URETEROSCOPY STENT INSERTION AND RETROGRADE PYELOGRAM;  Surgeon: Kenneth Lopez MD;  Location: [facility];  Service: Urology    HYSTERECTOMY      OOPHORECTOMY      OTHER SURGICAL HISTORY      lysis of adhesions for bowel obstruction x 2    RELEASE CARPAL TUNNEL Bilateral     ZZC TOTAL KNEE ARTHROPLASTY Left 1/7/2016    Procedure: TOTAL KNEE  ARTHROPLASTY;  Surgeon: Boubacar Cortez MD;  Location: [facility];  Service: Orthopedics     Current Outpatient Medications   Medication Sig Dispense Refill    albuterol (PROAIR HFA) 90 mcg/actuation inhaler [ALBUTEROL (PROAIR HFA) 90 MCG/ACTUATION INHALER] Inhale 2 puffs every 6 (six) hours as needed for wheezing. 1 Inhaler 1    budesonide-formoterol (SYMBICORT) 160-4.5 MCG/ACT Inhaler Inhale 2 puffs into the lungs 2 times daily 10.2 g 11    folic acid (FOLVITE) 1 MG tablet [FOLIC ACID (FOLVITE) 1 MG TABLET] Take 1 mg by mouth.      ipratropium - albuterol 0.5 mg/2.5 mg/3 mL (DUONEB) 0.5-2.5 (3) MG/3ML neb solution Take 1 vial (3 mLs) by nebulization every 6 hours as needed for shortness of breath, wheezing or cough Take 3-4 times daily while acutely ill, then back off to q6h PRN 90 mL 4    methotrexate 2.5 MG tablet Take 10 tablets by mouth once a week      pregabalin (LYRICA) 75 MG capsule Take 75 mg by mouth daily      tiotropium (SPIRIVA HANDIHALER) 18 MCG inhaled capsule Inhale 1 capsule (18 mcg) into the lungs daily 90 capsule 4       Allergies   Allergen Reactions    Dilaudid [Hydromorphone] Nausea and Vomiting    Hydrocodone Nausea and Vomiting    Toradol [Ketorolac] Nausea  "and Vomiting        Social History     Tobacco Use    Smoking status: Never     Passive exposure: Yes    Smokeless tobacco: Never   Substance Use Topics    Alcohol use: No     Family History   Problem Relation Age of Onset    Cancer Mother     Cancer Father     Clotting Disorder Sister     Cancer Sister     Kidney Disease Brother     Prostate Cancer Brother     Cancer Brother     Urolithiasis No family hx of     Diabetes No family hx of     Gout No family hx of     Heart Disease No family hx of      History   Drug Use No         Objective     /76   Pulse 75   Temp 98.6  F (37  C)   Resp 18   Ht 1.544 m (5' 0.79\")   Wt 81.6 kg (180 lb)   BMI 34.25 kg/m      Physical Exam        General Appearance:    Alert, cooperative, no distress   Eyes:   No scleral icterus or conjunctival irritation       Ears:    Normal TM's and external ear canals, both ears   Throat:   Lips, mucosa, and tongue normal; teeth and gums normal   Neck:   Supple, symmetrical, trachea midline, no adenopathy;        thyroid:  No enlargement/tenderness/nodules   Lungs:     Clear to auscultation bilaterally, respirations unlabored, no wheezes or crackles   Heart:    Regular rate and rhythm,  No murmur   Abdomen:    Soft, no distention, no tenderness on palpation, no masses, no organomegaly     Extremities:  No edema, no joint swelling or redness, no evidence of any injuries   Skin:  No concerning skin findings, no suspicious moles, no rashes a few scrapes are noted on the left fingers, and the chin   Neurologic:  On gross examination there is no motor or sensory deficit.  Patient walks with a normal gait       Recent Labs   Lab Test 01/23/23  1614 08/08/22  1028   HGB 14.2 12.9     --      --    POTASSIUM 5.5*  --    CR 0.97*  --         Diagnostics:  Labs pending at this time.  Results will be reviewed when available.     Recent Results (from the past 24 hour(s))   CBC with platelets    Collection Time: 09/26/23  3:19 PM "   Result Value Ref Range    WBC Count 8.9 4.0 - 11.0 10e3/uL    RBC Count 4.25 3.80 - 5.20 10e6/uL    Hemoglobin 13.4 11.7 - 15.7 g/dL    Hematocrit 38.8 35.0 - 47.0 %    MCV 91 78 - 100 fL    MCH 31.5 26.5 - 33.0 pg    MCHC 34.5 31.5 - 36.5 g/dL    RDW 13.2 10.0 - 15.0 %    Platelet Count 213 150 - 450 10e3/uL   EKG 12-lead, tracing only    Collection Time: 09/26/23  3:26 PM   Result Value Ref Range    Systolic Blood Pressure  mmHg    Diastolic Blood Pressure  mmHg    Ventricular Rate 68 BPM    Atrial Rate 68 BPM    AL Interval 140 ms    QRS Duration 76 ms     ms    QTc 418 ms    P Axis 47 degrees    R AXIS -14 degrees    T Axis 58 degrees    Interpretation ECG       Sinus rhythm  Normal ECG  When compared with ECG of 02-JUN-2020 13:04,  No significant change was found          Revised Cardiac Risk Index (RCRI):  The patient has the following serious cardiovascular risks for perioperative complications:   - No serious cardiac risks = 0 points     RCRI Interpretation: 0 points: Class I (very low risk - 0.4% complication rate)         Signed Electronically by: Kenneth Acuna MD, MD  Copy of this evaluation report is provided to requesting physician.

## 2023-10-03 ENCOUNTER — TRANSFERRED RECORDS (OUTPATIENT)
Dept: HEALTH INFORMATION MANAGEMENT | Facility: CLINIC | Age: 63
End: 2023-10-03
Payer: COMMERCIAL

## 2023-10-17 ENCOUNTER — TRANSFERRED RECORDS (OUTPATIENT)
Dept: HEALTH INFORMATION MANAGEMENT | Facility: CLINIC | Age: 63
End: 2023-10-17
Payer: COMMERCIAL

## 2023-11-04 ENCOUNTER — HEALTH MAINTENANCE LETTER (OUTPATIENT)
Age: 63
End: 2023-11-04

## 2023-11-07 ENCOUNTER — TRANSFERRED RECORDS (OUTPATIENT)
Dept: HEALTH INFORMATION MANAGEMENT | Facility: CLINIC | Age: 63
End: 2023-11-07
Payer: COMMERCIAL

## 2023-11-14 ENCOUNTER — TELEPHONE (OUTPATIENT)
Dept: PULMONOLOGY | Facility: CLINIC | Age: 63
End: 2023-11-14
Payer: COMMERCIAL

## 2023-11-14 DIAGNOSIS — J45.51 SEVERE PERSISTENT ASTHMA WITH ACUTE EXACERBATION (H): ICD-10-CM

## 2023-11-14 DIAGNOSIS — J44.1 OBSTRUCTIVE CHRONIC BRONCHITIS WITH EXACERBATION (H): Primary | ICD-10-CM

## 2023-11-14 RX ORDER — AZITHROMYCIN 250 MG/1
TABLET, FILM COATED ORAL
Qty: 6 TABLET | Refills: 0 | Status: SHIPPED | OUTPATIENT
Start: 2023-11-14 | End: 2023-11-19

## 2023-11-14 RX ORDER — PREDNISONE 20 MG/1
40 TABLET ORAL DAILY
Qty: 14 TABLET | Refills: 0 | Status: SHIPPED | OUTPATIENT
Start: 2023-11-14 | End: 2023-11-21

## 2023-11-14 NOTE — TELEPHONE ENCOUNTER
Health Call Center    Phone Message    May a detailed message be left on voicemail: yes     Reason for Call: Medication Question or concern regarding medication   Prescription Clarification  Name of Medication: Azithromycin and Prednisone     Prescribing Provider: Dr. Ferreira     Pharmacy: [id number] [place], [place] - [address] [facility] [address] [facility]       What on the order needs clarification? Pt states she has a cough, phlegm, and a little laryngitis for about 5-6 days.  Pt was told she could call Dr. Ferreira's office and a prescription can be sent in to her pharmacy when she is not feeling well or having cough symptoms.    Please contact the Pt back with any question or issues sending in prescription.      Action Taken: Message routed to:  Other: [facility]

## 2023-11-14 NOTE — TELEPHONE ENCOUNTER
Spoke with Amy. She is having a flare up of symptoms at this time. She reports increase in cough, yellow colored phlegm, chest congestion, but denies fever or shortness of breath. Per Cristiane Marcum CNP action plan order, will send  Prednisone 40mg x7 days + azithromycin x5 days .  She agrees to go to the urgent care or ER if symptoms do not improve or get worse.

## 2023-11-17 ENCOUNTER — TRANSFERRED RECORDS (OUTPATIENT)
Dept: HEALTH INFORMATION MANAGEMENT | Facility: CLINIC | Age: 63
End: 2023-11-17
Payer: COMMERCIAL

## 2023-11-27 ASSESSMENT — ASTHMA QUESTIONNAIRES: ACT_TOTALSCORE: 15

## 2023-12-04 ENCOUNTER — OFFICE VISIT (OUTPATIENT)
Dept: PULMONOLOGY | Facility: CLINIC | Age: 63
End: 2023-12-04
Payer: COMMERCIAL

## 2023-12-04 VITALS
OXYGEN SATURATION: 95 % | BODY MASS INDEX: 34.25 KG/M2 | DIASTOLIC BLOOD PRESSURE: 82 MMHG | HEART RATE: 80 BPM | SYSTOLIC BLOOD PRESSURE: 130 MMHG | WEIGHT: 180 LBS | TEMPERATURE: 97.9 F

## 2023-12-04 DIAGNOSIS — M06.9 RHEUMATOID ARTHRITIS, INVOLVING UNSPECIFIED SITE, UNSPECIFIED WHETHER RHEUMATOID FACTOR PRESENT (H): ICD-10-CM

## 2023-12-04 DIAGNOSIS — J45.51 SEVERE PERSISTENT ASTHMA WITH ACUTE EXACERBATION (H): Primary | ICD-10-CM

## 2023-12-04 PROCEDURE — 99214 OFFICE O/P EST MOD 30 MIN: CPT | Performed by: NURSE PRACTITIONER

## 2023-12-04 RX ORDER — PREDNISONE 10 MG/1
TABLET ORAL
Qty: 30 TABLET | Refills: 0 | Status: SHIPPED | OUTPATIENT
Start: 2023-12-04 | End: 2023-12-16

## 2023-12-04 RX ORDER — AZITHROMYCIN 250 MG/1
TABLET, FILM COATED ORAL
Qty: 6 TABLET | Refills: 0 | Status: SHIPPED | OUTPATIENT
Start: 2023-12-04 | End: 2023-12-09

## 2023-12-04 RX ORDER — CODEINE PHOSPHATE AND GUAIFENESIN 10; 100 MG/5ML; MG/5ML
1-2 SOLUTION ORAL EVERY 4 HOURS PRN
Qty: 473 ML | Refills: 0 | Status: SHIPPED | OUTPATIENT
Start: 2023-12-04

## 2023-12-04 NOTE — PROGRESS NOTES
Pulmonary Clinic Follow-Up          Assessment/Plan:       Amy Sands is a 63 year old non-smoker with history of asthma/chronic bronchitis, chronic TB, RA, fibromyalgia, presenting today for 6 month follow-up.    Severe persistent asthma  PFT on 8/8/2022 showed normal spirometry, normal lung volumes, normal diffusion capacity without clinically significant bronchodilator response.  CT chest on 7/30/2021 that showed no evidence of interstitial lung disease that may be associated with rheumatoid arthritis.  This was repeated in 6/2023, no evidence of ILD.  Triggers for breathing include cold and humid weather.  She presents to clinic today for 6 month follow-up and exacerbation symptoms, she was exposed to sick grandkids.  Plan:  - start extended prednisone taper and azithromycin.  - okay for guaifenesin w/codeine to help with sleep d/t severe cough.  - start Duonebs 3-4 times daily, with flutter valve.  Then back off to PRN once feeling improved.    - start Mucinex 600mg ER once-twice daily, with full glass of water.  - continue Symbicort (budesonide/formoterol) 160/4.5, two puffs BID, rinse/gargle after use.  - continue Spiriva (tiotropium), one inhalation daily.  - continue Albuterol inhaler PRN  - she is UTD with COVID vaccines and booster, annual influenza vaccine, and pneumococcal vaccine.  - Action plan:  Prednisone 40mg x7 days + azithromycin x5 days.    Rheumatoid arthritis   Followed at [facility], by Dr Banegas.  Last visit 5/3/23.  No evidence of ILD as of 6/2023 chest CT.  On methotrexate 25mg weekly.  Plan:  - continue following with rheumatology    Follow-up  - 6 months      Cristiane Marcum CNP  Pulmonary Medicine  [facility]  [phone]         CC:     Asthma follow-up     HPI:     Amy Sands is a 63 year old non-smoker with history of asthma/chronic bronchitis, chronic TB, RA, fibromyalgia, presenting today for 6 month follow-up.    Since last visit  (6/2023), breathing has been stable.  Did require action plan x1 since last visit, in November.  She did improve but then was exposed to sick grandkids and now is coughing and having exacerbation again.  She is using duonebs QID while ill.  Using mucinex.  Cough is so severe it keeps her up at night, and her ribs and muscles hurt from coughing.          11/11/2022     7:00 AM 11/27/2023     6:37 PM   ACT Total Scores   ACT TOTAL SCORE (Goal Greater than or Equal to 20) 17 15   In the past 12 months, how many times did you visit the emergency room for your asthma without being admitted to the hospital? 0 0   In the past 12 months, how many times were you hospitalized overnight because of your asthma? 0 0            ROS:     6-point ROS performed and is negative aside from those listed in HPI.     Medical history:     Past Medical History:   Diagnosis Date    Arthritis     Arthritis     Benign Pigmented Nevus     Created by Conversion  Replacement Utility updated for latest IMO load       Chest Pain     Created by Conversion       Chronic bronchitis (H)     Chronic bronchitis (H)     Chronic Bronchitis With Acute Exacerbation     Created by Conversion       Fibromyalgia     Fibromyalgia     History of anesthesia complications     HARD WAKEUP AND LOW BP    History of anesthesia complications     HARD WAKEUP AND LOW BP    Hydronephrosis with urinary obstruction due to ureteral calculus     Joint Pain, Localized In The Knee     Created by Conversion       Kidney stone     Kidney stone     Obesity (BMI 35.0-39.9) with comorbidity (H) 06/02/2020    PONV (postoperative nausea and vomiting)     motion sickness    PONV (postoperative nausea and vomiting)     motion sickness    Pre-op exam 01/11/2019    Primary osteoarthritis of left knee 01/07/2016    Productive cough 06/14/2018    Rheumatoid arthritis involving right wrist, unspecified whether rheumatoid factor present (H) 07/06/2021    Right flank discomfort 03/20/2019     Right Rotator Cuff Tendonitis     Created by Conversion  Replacement Utility updated for latest IMO load       Rosacea     Rosacea     Rotator cuff tendonitis, right     Rotator cuff tendonitis, right     Sleep apnea     DOESNT USE CPAP    Sleep apnea     DOESNT USE CPAP    TB lung, latent 06/14/2018    Total knee replacement status 01/09/2016    Urinary urgency 03/20/2019    Visit for screening mammogram 06/14/2018     Past Surgical History:   Procedure Laterality Date    ARTHROSCOPY KNEE Left     COMBINED CYSTOSCOPY, INSERT STENT URETER(S) Right 3/25/2019    Procedure: CYSTOSCOPY, RIGHT URETEROSCOPY STENT INSERTION AND RETROGRADE PYELOGRAM;  Surgeon: Kenneth Lopez MD;  Location: [facility];  Service: Urology    HYSTERECTOMY      OOPHORECTOMY      OTHER SURGICAL HISTORY      lysis of adhesions for bowel obstruction x 2    RELEASE CARPAL TUNNEL Bilateral     ZZC TOTAL KNEE ARTHROPLASTY Left 1/7/2016    Procedure: TOTAL KNEE  ARTHROPLASTY;  Surgeon: Boubacar Cortez MD;  Location: [facility];  Service: Orthopedics     Social History     Tobacco Use    Smoking status: Never     Passive exposure: Yes    Smokeless tobacco: Never   Vaping Use    Vaping Use: Never used   Substance Use Topics    Alcohol use: No    Drug use: No     Current Outpatient Medications   Medication    albuterol (PROAIR HFA) 90 mcg/actuation inhaler    azithromycin (ZITHROMAX) 250 MG tablet    budesonide-formoterol (SYMBICORT) 160-4.5 MCG/ACT Inhaler    folic acid (FOLVITE) 1 MG tablet    guaiFENesin-codeine (ROBITUSSIN AC) 100-10 MG/5ML solution    ipratropium - albuterol 0.5 mg/2.5 mg/3 mL (DUONEB) 0.5-2.5 (3) MG/3ML neb solution    methotrexate 2.5 MG tablet    predniSONE (DELTASONE) 10 MG tablet    pregabalin (LYRICA) 75 MG capsule    tiotropium (SPIRIVA HANDIHALER) 18 MCG inhaled capsule     No current facility-administered medications for this visit.        Physical Exam:     /82 (BP Location: Left arm,  Patient Position: Chair, Cuff Size: Adult Large)   Pulse 80   Temp 97.9  F (36.6  C) (Oral)   Wt 81.6 kg (180 lb)   SpO2 95%   BMI 34.25 kg/m    Gen: adult female, appears in NAD  HEENT: clear conjunctivae, moist mucous membranes  CV: RRR, no M/G/R  Resp: CTAB.  Respirations even and unlabored.  On RA.  Dry cough throughout exam.  Skin: no apparent rashes on visible skin  Ext: no cyanosis, clubbing or edema  Neuro: alert and answering questions appropriately       Data:     Chest CT 6/8/23:  IMPRESSION:   1.  No fibrotic interstitial lung disease.   2.  Minimal basilar cylindrical bronchiectasis. No significant airway thickening. Cannot exclude modest gas trapping on expiration.  3.  Small hiatus hernia.  4.  Cholelithiasis.    EXAM: CT CHEST HI-RESOLUTION WO CONTRAST  LOCATION: [facility]  DATE/TIME: 7/30/2021 8:04 AM  INDICATION: Dyspnea, chronic, unclear etiology  COMPARISON: 10/17/2012  TECHNIQUE: High resolution images were obtained through the chest during inspiration with select expiratory views. Prone imaging was performed. Multiplanar reformats were obtained. Dose reduction techniques were used.  CONTRAST: None.  FINDINGS:   LUNGS AND PLEURA: No interstitial thickening, honeycombing, fibrosis, bronchiectasis, air trapping or groundglass opacities. No pleural effusion. A few stable benign scattered pulmonary nodules measuring 0.3 cm or less.  MEDIASTINUM/AXILLAE: Normal caliber aorta. No pericardial effusion.  CORONARY ARTERY CALCIFICATION: Mild.  UPPER ABDOMEN: Hepatic steatosis. Cholelithiasis.  MUSCULOSKELETAL: Mild degenerative changes of the spine.                                                            IMPRESSION:   1.  No evidence of interstitial lung disease.  2.  Hepatic steatosis.  3.  Cholelithiasis.      PFTs 8/2022:  The FVC, FEV1, FEV1/FVC ratio and MQD57-46% are within normal limits.  The inspiratory flow rates are within normal limits.  Lung volumes are  within normal limits.  Following administration of bronchodilators, there is no significant response.  The   diffusing capacity is normal.   The results are within normal limits.   IMPRESSION:   Normal Pulmonary Function

## 2023-12-04 NOTE — PATIENT INSTRUCTIONS
It was a pleasure to see you in clinic today.   Here is what we discussed:    Start prednisone taper and z-pack.  Okay to codeine cough syrup at night.  You can use mucinex as well to help bring up mucous.  Continue Symbicort two puffs twice daily, rinse/gargle after use.  Continue Spiriva one puff daily.  Continue Albuterol or Duonebs every 4-6 hours as needed for shortness of breath or wheezing.  Call my nurse, Aníbal ([phone]) with any change or worsening of your breathing.  Follow-up in 6 months.    Cristiane Marcum, CNP  Pulmonary Medicine  [facility]  [phone]

## 2023-12-28 ENCOUNTER — HOSPITAL ENCOUNTER (OUTPATIENT)
Dept: MAMMOGRAPHY | Facility: CLINIC | Age: 63
Discharge: HOME | End: 2023-12-28
Attending: FAMILY MEDICINE | Admitting: FAMILY MEDICINE
Payer: COMMERCIAL

## 2023-12-28 DIAGNOSIS — Z12.31 VISIT FOR SCREENING MAMMOGRAM: ICD-10-CM

## 2023-12-28 PROCEDURE — 77067 SCR MAMMO BI INCL CAD: CPT

## 2024-01-05 ENCOUNTER — TRANSFERRED RECORDS (OUTPATIENT)
Dept: HEALTH INFORMATION MANAGEMENT | Facility: CLINIC | Age: 64
End: 2024-01-05
Payer: COMMERCIAL

## 2024-01-22 ENCOUNTER — TRANSFERRED RECORDS (OUTPATIENT)
Dept: HEALTH INFORMATION MANAGEMENT | Facility: CLINIC | Age: 64
End: 2024-01-22
Payer: COMMERCIAL

## 2024-02-01 ENCOUNTER — TRANSFERRED RECORDS (OUTPATIENT)
Dept: HEALTH INFORMATION MANAGEMENT | Facility: CLINIC | Age: 64
End: 2024-02-01
Payer: COMMERCIAL

## 2024-02-12 ENCOUNTER — TRANSFERRED RECORDS (OUTPATIENT)
Dept: HEALTH INFORMATION MANAGEMENT | Facility: CLINIC | Age: 64
End: 2024-02-12
Payer: COMMERCIAL

## 2024-04-18 ENCOUNTER — TRANSFERRED RECORDS (OUTPATIENT)
Dept: HEALTH INFORMATION MANAGEMENT | Facility: CLINIC | Age: 64
End: 2024-04-18
Payer: COMMERCIAL

## 2024-04-24 ENCOUNTER — TRANSFERRED RECORDS (OUTPATIENT)
Dept: HEALTH INFORMATION MANAGEMENT | Facility: CLINIC | Age: 64
End: 2024-04-24
Payer: COMMERCIAL

## 2024-04-29 ENCOUNTER — OFFICE VISIT (OUTPATIENT)
Dept: FAMILY MEDICINE | Facility: CLINIC | Age: 64
End: 2024-04-29
Payer: COMMERCIAL

## 2024-04-29 VITALS
RESPIRATION RATE: 14 BRPM | HEART RATE: 70 BPM | OXYGEN SATURATION: 97 % | WEIGHT: 189.6 LBS | DIASTOLIC BLOOD PRESSURE: 82 MMHG | HEIGHT: 61 IN | TEMPERATURE: 97.8 F | BODY MASS INDEX: 35.8 KG/M2 | SYSTOLIC BLOOD PRESSURE: 140 MMHG

## 2024-04-29 DIAGNOSIS — S46.001A ROTATOR CUFF INJURY, RIGHT, INITIAL ENCOUNTER: ICD-10-CM

## 2024-04-29 DIAGNOSIS — Z23 NEED FOR SHINGLES VACCINE: ICD-10-CM

## 2024-04-29 DIAGNOSIS — Z11.59 NEED FOR HEPATITIS C SCREENING TEST: ICD-10-CM

## 2024-04-29 DIAGNOSIS — Z01.818 ENCOUNTER FOR PRE-OPERATIVE EXAMINATION: Primary | ICD-10-CM

## 2024-04-29 DIAGNOSIS — M67.919 DISORDER OF BURSAE AND TENDONS IN SHOULDER REGION: ICD-10-CM

## 2024-04-29 DIAGNOSIS — M79.7 FIBROMYALGIA: ICD-10-CM

## 2024-04-29 DIAGNOSIS — M71.9 DISORDER OF BURSAE AND TENDONS IN SHOULDER REGION: ICD-10-CM

## 2024-04-29 DIAGNOSIS — J45.50 SEVERE PERSISTENT ASTHMA WITHOUT COMPLICATION (H): ICD-10-CM

## 2024-04-29 DIAGNOSIS — M06.9: ICD-10-CM

## 2024-04-29 DIAGNOSIS — Z11.4 SCREENING FOR HIV (HUMAN IMMUNODEFICIENCY VIRUS): ICD-10-CM

## 2024-04-29 LAB
ANION GAP SERPL CALCULATED.3IONS-SCNC: 10 MMOL/L (ref 7–15)
BUN SERPL-MCNC: 16.2 MG/DL (ref 8–23)
CALCIUM SERPL-MCNC: 9.4 MG/DL (ref 8.8–10.2)
CHLORIDE SERPL-SCNC: 104 MMOL/L (ref 98–107)
CREAT SERPL-MCNC: 0.75 MG/DL (ref 0.51–0.95)
DEPRECATED HCO3 PLAS-SCNC: 25 MMOL/L (ref 22–29)
EGFRCR SERPLBLD CKD-EPI 2021: 88 ML/MIN/1.73M2
ERYTHROCYTE [DISTWIDTH] IN BLOOD BY AUTOMATED COUNT: 13.2 % (ref 10–15)
GLUCOSE SERPL-MCNC: 95 MG/DL (ref 70–99)
HCT VFR BLD AUTO: 42.3 % (ref 35–47)
HGB BLD-MCNC: 14.1 G/DL (ref 11.7–15.7)
MCH RBC QN AUTO: 31.3 PG (ref 26.5–33)
MCHC RBC AUTO-ENTMCNC: 33.3 G/DL (ref 31.5–36.5)
MCV RBC AUTO: 94 FL (ref 78–100)
PLATELET # BLD AUTO: 223 10E3/UL (ref 150–450)
POTASSIUM SERPL-SCNC: 4.8 MMOL/L (ref 3.4–5.3)
RBC # BLD AUTO: 4.51 10E6/UL (ref 3.8–5.2)
SODIUM SERPL-SCNC: 139 MMOL/L (ref 135–145)
WBC # BLD AUTO: 8.9 10E3/UL (ref 4–11)

## 2024-04-29 PROCEDURE — 80048 BASIC METABOLIC PNL TOTAL CA: CPT | Performed by: NURSE PRACTITIONER

## 2024-04-29 PROCEDURE — 36415 COLL VENOUS BLD VENIPUNCTURE: CPT | Performed by: NURSE PRACTITIONER

## 2024-04-29 PROCEDURE — 85027 COMPLETE CBC AUTOMATED: CPT | Performed by: NURSE PRACTITIONER

## 2024-04-29 PROCEDURE — 99214 OFFICE O/P EST MOD 30 MIN: CPT | Performed by: NURSE PRACTITIONER

## 2024-04-29 ASSESSMENT — PAIN SCALES - GENERAL: PAINLEVEL: EXTREME PAIN (8)

## 2024-04-29 NOTE — PROGRESS NOTES
Preoperative Evaluation  [facility]  [address]  [facility] [address]  Phone: [phone]  Fax: [phone]  Primary Provider: Kenneth Acuna  Pre-op Performing Provider: TISHA TOM  Apr 29, 2024       Amy is a 63 year old, presenting for the following:  Pre-Op Exam (Right shoulder rotator cuff )        4/29/2024     8:17 AM   Additional Questions   Roomed by tasha     Surgical Information  Surgery/Procedure: right shoulder rotator cuff  [facility]   Surgeon: Dr miller  Surgery Date: 05/03/2024  Time of Surgery: N/A  Where patient plans to recover: At home with family  Fax number for surgical facility: [phone]    Assessment & Plan     The proposed surgical procedure is considered INTERMEDIATE risk.    Encounter for pre-operative examination  Rotator cuff injury, right  Preoperative examination completed today.  Exam is overall unremarkable.  Lab results are pending at this time and will be reviewed when available.  Reviewed patient's medications and allergies and no contraindications to the procedure identified today.  She may proceed with scheduled procedure with choice of anesthesia.  - Basic metabolic panel  (Ca, Cl, CO2, Creat, Gluc, K, Na, BUN)  - CBC with platelets  - Basic metabolic panel  (Ca, Cl, CO2, Creat, Gluc, K, Na, BUN)  - CBC with platelets      Rheumatoid arthritis of left wrist (H)  She continues methotrexate weekly.  Will continue to follow closely with rheumatology.    Fibromyalgia  Symptoms stable.  She continues Lyrica.    Severe persistent asthma without complication (H28)  Asthma symptoms are well-controlled on current regimen.  No recent exacerbations or concerns in this regard.           - No identified additional risk factors other than previously addressed    Antiplatelet or Anticoagulation Medication Instructions   - Patient is on no antiplatelet or anticoagulation medications.    Additional Medication  "Instructions  Patient is to take all scheduled medications on the day of surgery  She is advised to hold NSAIDs, and any vitamins and supplements from now until surgery.    Recommendation  APPROVAL GIVEN to proceed with proposed procedure, without further diagnostic evaluation.      Subjective       HPI related to upcoming procedure: Patient presents today for preoperative examination.  She will be undergoing repair of right rotator cuff tear.  Medical history significant for chronic rheumatoid arthritis.  She recently underwent left wrist surgery in the fall 2023.  She states that this went well.  She continues to follow closely with rheumatology and remains on methotrexate, tolerating this well.  She has fibromyalgia, currently managed with Lyrica.  No concerns in this regard today.  She has a diagnosis of severe persistent asthma.  She continues use of controller medication and reports no recent exacerbations or breathing difficulties.  Her symptoms remain well-controlled on her current regimen.  Patient states that she has tolerated anesthesia well in the past without any significant complications.  She reports being \"slow to come out of anesthesia\".  No personal or family history of bleeding or clotting disorders.  She denies any recent infections and feels well today.        4/25/2024     9:36 AM   Preop Questions   1. Have you ever had a heart attack or stroke? No   2. Have you ever had surgery on your heart or blood vessels, such as a stent placement, a coronary artery bypass, or surgery on an artery in your head, neck, heart, or legs? No   3. Do you have chest pain with activity? No   4. Do you have a history of  heart failure? No   5. Do you currently have a cold, bronchitis or symptoms of other infection? No   6. Do you have a cough, shortness of breath, or wheezing? No   7. Do you or anyone in your family have previous history of blood clots? YES - no personal hx of PE, no known genetic conditions   8. " Do you or does anyone in your family have a serious bleeding problem such as prolonged bleeding following surgeries or cuts? NO   9. Have you ever had problems with anemia or been told to take iron pills? No   10. Have you had any abnormal blood loss such as black, tarry or bloody stools, or abnormal vaginal bleeding? No   11. Have you ever had a blood transfusion? No   12. Are you willing to have a blood transfusion if it is medically needed before, during, or after your surgery? Yes   13. Have you or any of your relatives ever had problems with anesthesia? YES - slow to come out of anesthesia    14. Do you have sleep apnea, excessive snoring or daytime drowsiness? No   15. Do you have any artifical heart valves or other implanted medical devices like a pacemaker, defibrillator, or continuous glucose monitor? No   16. Do you have artificial joints? YES - bilateral knees and right wrist   17. Are you allergic to latex? No       Health Care Directive  Patient has a Health Care Directive on file      Preoperative Review of    reviewed - controlled substances reflected in medication list.      Status of Chronic Conditions:  See problem list for active medical problems.  Problems all longstanding and stable, except as noted/documented.  See ROS for pertinent symptoms related to these conditions.    Patient Active Problem List    Diagnosis Date Noted    Rheumatoid arthritis involving right wrist, unspecified whether rheumatoid factor present (H) 07/06/2021     Priority: Medium    Obesity (BMI 35.0-39.9) with comorbidity (H) 06/02/2020     Priority: Medium    Hydronephrosis with urinary obstruction due to ureteral calculus      Priority: Medium    Urinary urgency 03/20/2019     Priority: Medium    Right flank discomfort 03/20/2019     Priority: Medium    Pre-op exam 01/11/2019     Priority: Medium    Fibromyalgia      Priority: Medium     Created by Conversion        Productive cough 06/14/2018     Priority: Medium     TB lung, latent 06/14/2018     Priority: Medium    Visit for screening mammogram 06/14/2018     Priority: Medium    Right Rotator Cuff Tendonitis      Priority: Medium     Created by Conversion  Replacement Utility updated for latest IMO load        Benign Pigmented Nevus      Priority: Medium     Created by Conversion  Replacement Utility updated for latest IMO load        Total knee replacement status 01/09/2016     Priority: Medium    Primary osteoarthritis of left knee 01/07/2016     Priority: Medium    Chronic Bronchitis With Acute Exacerbation      Priority: Medium     Created by Conversion        Strained Right Pectoralis Major Muscle      Priority: Medium     Created by Conversion        Rosacea      Priority: Medium     Created by Conversion        Lentigo      Priority: Medium     Created by Conversion        Chest Pain      Priority: Medium     Created by Conversion        Joint Pain, Localized In The Knee      Priority: Medium     Created by Conversion          Past Medical History:   Diagnosis Date    Arthritis     Arthritis     Benign Pigmented Nevus     Created by Conversion  Replacement Utility updated for latest IMO load       Chest Pain     Created by Conversion       Chronic bronchitis (H)     Chronic bronchitis (H)     Chronic Bronchitis With Acute Exacerbation     Created by Conversion       Fibromyalgia     Fibromyalgia     History of anesthesia complications     HARD WAKEUP AND LOW BP    History of anesthesia complications     HARD WAKEUP AND LOW BP    Hydronephrosis with urinary obstruction due to ureteral calculus     Joint Pain, Localized In The Knee     Created by Conversion       Kidney stone     Kidney stone     Obesity (BMI 35.0-39.9) with comorbidity (H) 06/02/2020    PONV (postoperative nausea and vomiting)     motion sickness    PONV (postoperative nausea and vomiting)     motion sickness    Pre-op exam 01/11/2019    Primary osteoarthritis of left knee 01/07/2016    Productive  cough 06/14/2018    Rheumatoid arthritis involving right wrist, unspecified whether rheumatoid factor present (H) 07/06/2021    Right flank discomfort 03/20/2019    Right Rotator Cuff Tendonitis     Created by Conversion  Replacement Utility updated for latest IMO load       Rosacea     Rosacea     Rotator cuff tendonitis, right     Rotator cuff tendonitis, right     Sleep apnea     DOESNT USE CPAP    Sleep apnea     DOESNT USE CPAP    TB lung, latent 06/14/2018    Total knee replacement status 01/09/2016    Urinary urgency 03/20/2019    Visit for screening mammogram 06/14/2018     Past Surgical History:   Procedure Laterality Date    ARTHROSCOPY KNEE Left     COMBINED CYSTOSCOPY, INSERT STENT URETER(S) Right 3/25/2019    Procedure: CYSTOSCOPY, RIGHT URETEROSCOPY STENT INSERTION AND RETROGRADE PYELOGRAM;  Surgeon: Kenneth Lopez MD;  Location: [facility];  Service: Urology    HYSTERECTOMY      OOPHORECTOMY      OTHER SURGICAL HISTORY      lysis of adhesions for bowel obstruction x 2    RELEASE CARPAL TUNNEL Bilateral     ZZC TOTAL KNEE ARTHROPLASTY Left 1/7/2016    Procedure: TOTAL KNEE  ARTHROPLASTY;  Surgeon: Boubacar Cortez MD;  Location: [facility];  Service: Orthopedics     Current Outpatient Medications   Medication Sig Dispense Refill    albuterol (PROAIR HFA) 90 mcg/actuation inhaler [ALBUTEROL (PROAIR HFA) 90 MCG/ACTUATION INHALER] Inhale 2 puffs every 6 (six) hours as needed for wheezing. 1 Inhaler 1    budesonide-formoterol (SYMBICORT) 160-4.5 MCG/ACT Inhaler Inhale 2 puffs into the lungs 2 times daily 10.2 g 11    folic acid (FOLVITE) 1 MG tablet [FOLIC ACID (FOLVITE) 1 MG TABLET] Take 1 mg by mouth.      guaiFENesin-codeine (ROBITUSSIN AC) 100-10 MG/5ML solution Take 5-10 mLs by mouth every 4 hours as needed for cough 473 mL 0    ipratropium - albuterol 0.5 mg/2.5 mg/3 mL (DUONEB) 0.5-2.5 (3) MG/3ML neb solution Take 1 vial (3 mLs) by nebulization every 6 hours as needed for  "shortness of breath, wheezing or cough Take 3-4 times daily while acutely ill, then back off to q6h PRN 90 mL 4    methotrexate 2.5 MG tablet Take 10 tablets by mouth once a week      pregabalin (LYRICA) 75 MG capsule Take 75 mg by mouth daily      tiotropium (SPIRIVA HANDIHALER) 18 MCG inhaled capsule Inhale 1 capsule (18 mcg) into the lungs daily 90 capsule 4       Allergies   Allergen Reactions    Dilaudid [Hydromorphone] Nausea and Vomiting    Hydrocodone Nausea and Vomiting    Toradol [Ketorolac] Nausea and Vomiting        Social History     Tobacco Use    Smoking status: Never     Passive exposure: Yes    Smokeless tobacco: Never   Substance Use Topics    Alcohol use: No     Family History   Problem Relation Age of Onset    Cancer Mother     Cancer Father     Clotting Disorder Sister     Cancer Sister     Kidney Disease Brother     Prostate Cancer Brother     Cancer Brother     Urolithiasis No family hx of     Diabetes No family hx of     Gout No family hx of     Heart Disease No family hx of      History   Drug Use No         Review of Systems    Review of Systems  Constitutional, HEENT, cardiovascular, pulmonary, gi and gu systems are negative, except as otherwise noted.    Objective    Pulse 70   Temp 97.8  F (36.6  C) (Oral)   Resp 14   Ht 1.544 m (5' 0.79\")   Wt 86 kg (189 lb 9.6 oz)   SpO2 97%   BMI 36.08 kg/m     Estimated body mass index is 36.08 kg/m  as calculated from the following:    Height as of this encounter: 1.544 m (5' 0.79\").    Weight as of this encounter: 86 kg (189 lb 9.6 oz).  Physical Exam  GENERAL: alert and no distress  EYES: Eyes grossly normal to inspection, PERRL and conjunctivae and sclerae normal  HENT: ear canals and TM's normal, nose and mouth without ulcers or lesions  NECK: no adenopathy, no asymmetry, masses, or scars  RESP: lungs clear to auscultation - no rales, rhonchi or wheezes  CV: regular rate and rhythm, normal S1 S2, no S3 or S4, no murmur, click or rub, no " peripheral edema  ABDOMEN: soft, nontender, no hepatosplenomegaly, no masses and bowel sounds normal  MS: no gross musculoskeletal defects noted, no edema  SKIN: no suspicious lesions or rashes  NEURO: Normal strength and tone, mentation intact and speech normal  PSYCH: mentation appears normal, affect normal/bright    Recent Labs   Lab Test 09/26/23  1519 01/23/23  1614   HGB 13.4 14.2    257    143   POTASSIUM 5.3 5.5*   CR 0.88 0.97*        Diagnostics  Labs pending at this time.  Results will be reviewed when available.   No EKG required, no history of coronary heart disease, significant arrhythmia, peripheral arterial disease or other structural heart disease.    Revised Cardiac Risk Index (RCRI)  The patient has the following serious cardiovascular risks for perioperative complications:   - No serious cardiac risks = 0 points     RCRI Interpretation: 0 points: Class I (very low risk - 0.4% complication rate)         Signed Electronically by: URIEL Savage CNP  Copy of this evaluation report is provided to requesting physician.

## 2024-05-03 ENCOUNTER — TRANSFERRED RECORDS (OUTPATIENT)
Dept: HEALTH INFORMATION MANAGEMENT | Facility: CLINIC | Age: 64
End: 2024-05-03
Payer: COMMERCIAL

## 2024-05-17 ENCOUNTER — TRANSFERRED RECORDS (OUTPATIENT)
Dept: HEALTH INFORMATION MANAGEMENT | Facility: CLINIC | Age: 64
End: 2024-05-17
Payer: COMMERCIAL

## 2024-05-31 ASSESSMENT — ASTHMA QUESTIONNAIRES
QUESTION_2 LAST FOUR WEEKS HOW OFTEN HAVE YOU HAD SHORTNESS OF BREATH: THREE TO SIX TIMES A WEEK
QUESTION_4 LAST FOUR WEEKS HOW OFTEN HAVE YOU USED YOUR RESCUE INHALER OR NEBULIZER MEDICATION (SUCH AS ALBUTEROL): ONCE A WEEK OR LESS
QUESTION_1 LAST FOUR WEEKS HOW MUCH OF THE TIME DID YOUR ASTHMA KEEP YOU FROM GETTING AS MUCH DONE AT WORK, SCHOOL OR AT HOME: A LITTLE OF THE TIME
ACT_TOTALSCORE: 18
QUESTION_3 LAST FOUR WEEKS HOW OFTEN DID YOUR ASTHMA SYMPTOMS (WHEEZING, COUGHING, SHORTNESS OF BREATH, CHEST TIGHTNESS OR PAIN) WAKE YOU UP AT NIGHT OR EARLIER THAN USUAL IN THE MORNING: ONCE A WEEK
ACT_TOTALSCORE: 18
QUESTION_5 LAST FOUR WEEKS HOW WOULD YOU RATE YOUR ASTHMA CONTROL: WELL CONTROLLED

## 2024-06-05 ENCOUNTER — OFFICE VISIT (OUTPATIENT)
Dept: PULMONOLOGY | Facility: CLINIC | Age: 64
End: 2024-06-05
Attending: NURSE PRACTITIONER
Payer: COMMERCIAL

## 2024-06-05 VITALS
DIASTOLIC BLOOD PRESSURE: 82 MMHG | BODY MASS INDEX: 35.58 KG/M2 | SYSTOLIC BLOOD PRESSURE: 128 MMHG | OXYGEN SATURATION: 95 % | HEART RATE: 84 BPM | WEIGHT: 187 LBS

## 2024-06-05 DIAGNOSIS — J45.50 SEVERE PERSISTENT ASTHMA WITHOUT COMPLICATION (H): Primary | ICD-10-CM

## 2024-06-05 DIAGNOSIS — J45.51 SEVERE PERSISTENT ASTHMA WITH ACUTE EXACERBATION (H): ICD-10-CM

## 2024-06-05 DIAGNOSIS — M06.9 RHEUMATOID ARTHRITIS, INVOLVING UNSPECIFIED SITE, UNSPECIFIED WHETHER RHEUMATOID FACTOR PRESENT (H): ICD-10-CM

## 2024-06-05 PROCEDURE — 99214 OFFICE O/P EST MOD 30 MIN: CPT | Performed by: NURSE PRACTITIONER

## 2024-06-05 RX ORDER — AZITHROMYCIN 250 MG/1
TABLET, FILM COATED ORAL
Qty: 6 TABLET | Refills: 0 | Status: SHIPPED | OUTPATIENT
Start: 2024-06-05 | End: 2024-06-10

## 2024-06-05 RX ORDER — PREDNISONE 20 MG/1
40 TABLET ORAL DAILY
Qty: 14 TABLET | Refills: 0 | Status: SHIPPED | OUTPATIENT
Start: 2024-06-05 | End: 2024-06-12

## 2024-06-05 NOTE — PATIENT INSTRUCTIONS
It was a pleasure to see you in clinic today.   Here is what we discussed:    Continue Symbicort two puffs twice daily, rinse/gargle after use.  Continue Spiriva one puff daily.  Call insurance and ask about Trelegy Ellipta, and let us know if you want a prescription.  Continue Albuterol inhaler  or Duonebs every 4-6 hours as needed for shortness of breath or wheezing.  Call my nurse, Aníbal ([phone]) with any change or worsening of your breathing.  You can start your action plan medications (prednisone + Z-pack) when you have any change in your breathing.  Follow-up in 6 months.    Cristiane Marcum CNP  Pulmonary Medicine  [facility]  [phone]

## 2024-06-05 NOTE — PROGRESS NOTES
Pulmonary Clinic Follow-Up          Assessment/Plan:       Amy Sands is a 63 year old non-smoker with history of asthma/chronic bronchitis, chronic TB, RA on methotrexate, fibromyalgia, presenting today for 6 month follow-up.    Severe persistent asthma  Pulmonary function testing on 8/8/2022 showed normal spirometry, normal lung volumes, normal diffusion capacity without clinically significant bronchodilator response.  CT chest on 7/30/2021 that showed no evidence of interstitial lung disease that may be associated with rheumatoid arthritis.  This was repeated in 6/2023, no evidence of ILD.  Triggers for breathing include cold and humid weather.   ACT score 18 today.  Only one URI over the past 6 months requiring her action plan.    Plan:  - continue Symbicort (budesonide/formoterol) 160/4.5, two puffs BID, rinse/gargle after use.  - continue Spiriva (tiotropium), one inhalation daily.  - she will contact insurance to check on Trelegy Ellipta, we may be able to simplify her regimen with this, if it is cost effective.  - continue Albuterol inhaler or Duonebs PRN  - she is UTD with COVID vaccines and booster, annual influenza vaccine, RSV vaccine, and pneumococcal vaccine.  - any change in breathing from baseline, consider chest CT scan d/t hx of RA and methotrexate.  - Action plan:  Prednisone 40mg x7 days + azithromycin x5 days.  I have supplied her with a script for these today to start when needed.    Rheumatoid arthritis   Followed at [facility], by Dr Banegas.  Last visit 5/3/23.  No evidence of ILD as of 6/2023 chest CT.  On methotrexate 25mg weekly.  Plan:  - continue following with rheumatology    Follow-up  - 6 months      Cristiane Marcum CNP  Pulmonary Medicine  [facility]  [phone]       CC:     Asthma follow-up     HPI:     Amy Sands is a 63 year old non-smoker with history of asthma/chronic bronchitis, chronic TB, RA on methotrexate, fibromyalgia,  presenting today for 6 month follow-up.    Since last visit (12/2023), breathing has been stable.  She recently had surgery on her shoulder, she will be starting PT.  She reports difficulty breathing with humidity and cold air.  This winter was better for her breathing as it was not as cold.          11/11/2022     7:00 AM 11/27/2023     6:37 PM 5/31/2024    11:34 AM   ACT Total Scores   ACT TOTAL SCORE (Goal Greater than or Equal to 20) 17 15 18   In the past 12 months, how many times did you visit the emergency room for your asthma without being admitted to the hospital? 0 0 0   In the past 12 months, how many times were you hospitalized overnight because of your asthma? 0 0 0            ROS:     6-point ROS performed and is negative aside from those listed in HPI.     Medical history:     Past Medical History:   Diagnosis Date    Arthritis     Arthritis     Benign Pigmented Nevus     Created by Conversion  Replacement Utility updated for latest IMO load       Chest Pain     Created by Conversion       Chronic bronchitis (H)     Chronic bronchitis (H)     Chronic Bronchitis With Acute Exacerbation     Created by Conversion       Fibromyalgia     Fibromyalgia     History of anesthesia complications     HARD WAKEUP AND LOW BP    History of anesthesia complications     HARD WAKEUP AND LOW BP    Hydronephrosis with urinary obstruction due to ureteral calculus     Joint Pain, Localized In The Knee     Created by Conversion       Kidney stone     Kidney stone     Obesity (BMI 35.0-39.9) with comorbidity (H) 06/02/2020    PONV (postoperative nausea and vomiting)     motion sickness    PONV (postoperative nausea and vomiting)     motion sickness    Pre-op exam 01/11/2019    Primary osteoarthritis of left knee 01/07/2016    Productive cough 06/14/2018    Rheumatoid arthritis involving right wrist, unspecified whether rheumatoid factor present (H) 07/06/2021    Right flank discomfort 03/20/2019    Right Rotator Cuff  Tendonitis     Created by Conversion  Replacement Utility updated for latest IMO load       Rosacea     Rosacea     Rotator cuff tendonitis, right     Rotator cuff tendonitis, right     Sleep apnea     DOESNT USE CPAP    Sleep apnea     DOESNT USE CPAP    TB lung, latent 06/14/2018    Total knee replacement status 01/09/2016    Urinary urgency 03/20/2019    Visit for screening mammogram 06/14/2018     Past Surgical History:   Procedure Laterality Date    ARTHROSCOPY KNEE Left     COMBINED CYSTOSCOPY, INSERT STENT URETER(S) Right 3/25/2019    Procedure: CYSTOSCOPY, RIGHT URETEROSCOPY STENT INSERTION AND RETROGRADE PYELOGRAM;  Surgeon: Kenneth Lopez MD;  Location: [facility];  Service: Urology    HYSTERECTOMY      OOPHORECTOMY      OTHER SURGICAL HISTORY      lysis of adhesions for bowel obstruction x 2    RELEASE CARPAL TUNNEL Bilateral     ZZC TOTAL KNEE ARTHROPLASTY Left 1/7/2016    Procedure: TOTAL KNEE  ARTHROPLASTY;  Surgeon: Boubacar Cortez MD;  Location: [facility];  Service: Orthopedics     Social History     Tobacco Use    Smoking status: Never     Passive exposure: Yes    Smokeless tobacco: Never   Vaping Use    Vaping status: Never Used   Substance Use Topics    Alcohol use: No    Drug use: No     Current Outpatient Medications   Medication Sig Dispense Refill    albuterol (PROAIR HFA) 90 mcg/actuation inhaler [ALBUTEROL (PROAIR HFA) 90 MCG/ACTUATION INHALER] Inhale 2 puffs every 6 (six) hours as needed for wheezing. 1 Inhaler 1    azithromycin (ZITHROMAX) 250 MG tablet Take 2 tablets (500 mg) by mouth daily for 1 day, THEN 1 tablet (250 mg) daily for 4 days. 6 tablet 0    budesonide-formoterol (SYMBICORT) 160-4.5 MCG/ACT Inhaler Inhale 2 puffs into the lungs 2 times daily 10.2 g 11    folic acid (FOLVITE) 1 MG tablet [FOLIC ACID (FOLVITE) 1 MG TABLET] Take 1 mg by mouth.      guaiFENesin-codeine (ROBITUSSIN AC) 100-10 MG/5ML solution Take 5-10 mLs by mouth every 4 hours as  needed for cough 473 mL 0    ipratropium - albuterol 0.5 mg/2.5 mg/3 mL (DUONEB) 0.5-2.5 (3) MG/3ML neb solution Take 1 vial (3 mLs) by nebulization every 6 hours as needed for shortness of breath, wheezing or cough Take 3-4 times daily while acutely ill, then back off to q6h PRN 90 mL 4    methotrexate 2.5 MG tablet Take 10 tablets by mouth once a week      predniSONE (DELTASONE) 20 MG tablet Take 2 tablets (40 mg) by mouth daily for 7 days 14 tablet 0    pregabalin (LYRICA) 75 MG capsule Take 75 mg by mouth daily      tiotropium (SPIRIVA HANDIHALER) 18 MCG inhaled capsule Inhale 1 capsule (18 mcg) into the lungs daily 90 capsule 4     No current facility-administered medications for this visit.        Physical Exam:     /82 (BP Location: Left arm, Patient Position: Chair, Cuff Size: Adult Large)   Pulse 84   Wt 84.8 kg (187 lb)   SpO2 95%   BMI 35.58 kg/m    Gen: adult female, appears in NAD  HEENT: clear conjunctivae, moist mucous membranes  CV: RRR, no M/G/R  Resp: CTAB.  Respirations even and unlabored.  On RA.  No cough.  Skin: no apparent rashes on visible skin  Ext: no cyanosis, clubbing or edema  Neuro: alert and answering questions appropriately       Data:     Chest CT 6/8/23:  IMPRESSION:   1.  No fibrotic interstitial lung disease.   2.  Minimal basilar cylindrical bronchiectasis. No significant airway thickening. Cannot exclude modest gas trapping on expiration.  3.  Small hiatus hernia.  4.  Cholelithiasis.    EXAM: CT CHEST HI-RESOLUTION WO CONTRAST  LOCATION: [facility]  DATE/TIME: 7/30/2021 8:04 AM  INDICATION: Dyspnea, chronic, unclear etiology  COMPARISON: 10/17/2012  TECHNIQUE: High resolution images were obtained through the chest during inspiration with select expiratory views. Prone imaging was performed. Multiplanar reformats were obtained. Dose reduction techniques were used.  CONTRAST: None.  FINDINGS:   LUNGS AND PLEURA: No interstitial thickening,  honeycombing, fibrosis, bronchiectasis, air trapping or groundglass opacities. No pleural effusion. A few stable benign scattered pulmonary nodules measuring 0.3 cm or less.  MEDIASTINUM/AXILLAE: Normal caliber aorta. No pericardial effusion.  CORONARY ARTERY CALCIFICATION: Mild.  UPPER ABDOMEN: Hepatic steatosis. Cholelithiasis.  MUSCULOSKELETAL: Mild degenerative changes of the spine.                                                            IMPRESSION:   1.  No evidence of interstitial lung disease.  2.  Hepatic steatosis.  3.  Cholelithiasis.      PFTs 8/2022:  The FVC, FEV1, FEV1/FVC ratio and NHR11-81% are within normal limits.  The inspiratory flow rates are within normal limits.  Lung volumes are within normal limits.  Following administration of bronchodilators, there is no significant response.  The   diffusing capacity is normal.   The results are within normal limits.   IMPRESSION:   Normal Pulmonary Function

## 2024-06-14 ENCOUNTER — TRANSFERRED RECORDS (OUTPATIENT)
Dept: HEALTH INFORMATION MANAGEMENT | Facility: CLINIC | Age: 64
End: 2024-06-14
Payer: COMMERCIAL

## 2024-07-22 ENCOUNTER — PATIENT OUTREACH (OUTPATIENT)
Dept: CARE COORDINATION | Facility: CLINIC | Age: 64
End: 2024-07-22
Payer: COMMERCIAL

## 2024-07-26 ENCOUNTER — TRANSFERRED RECORDS (OUTPATIENT)
Dept: HEALTH INFORMATION MANAGEMENT | Facility: CLINIC | Age: 64
End: 2024-07-26
Payer: COMMERCIAL

## 2024-09-27 ENCOUNTER — TRANSFERRED RECORDS (OUTPATIENT)
Dept: HEALTH INFORMATION MANAGEMENT | Facility: CLINIC | Age: 64
End: 2024-09-27
Payer: COMMERCIAL

## 2024-10-19 ENCOUNTER — HEALTH MAINTENANCE LETTER (OUTPATIENT)
Age: 64
End: 2024-10-19

## 2024-10-31 ENCOUNTER — TRANSFERRED RECORDS (OUTPATIENT)
Dept: HEALTH INFORMATION MANAGEMENT | Facility: CLINIC | Age: 64
End: 2024-10-31
Payer: COMMERCIAL

## 2024-12-04 ASSESSMENT — ASTHMA QUESTIONNAIRES
QUESTION_1 LAST FOUR WEEKS HOW MUCH OF THE TIME DID YOUR ASTHMA KEEP YOU FROM GETTING AS MUCH DONE AT WORK, SCHOOL OR AT HOME: A LITTLE OF THE TIME
ACT_TOTALSCORE: 20
QUESTION_5 LAST FOUR WEEKS HOW WOULD YOU RATE YOUR ASTHMA CONTROL: WELL CONTROLLED
ACT_TOTALSCORE: 20
QUESTION_4 LAST FOUR WEEKS HOW OFTEN HAVE YOU USED YOUR RESCUE INHALER OR NEBULIZER MEDICATION (SUCH AS ALBUTEROL): ONCE A WEEK OR LESS
QUESTION_2 LAST FOUR WEEKS HOW OFTEN HAVE YOU HAD SHORTNESS OF BREATH: ONCE OR TWICE A WEEK
QUESTION_3 LAST FOUR WEEKS HOW OFTEN DID YOUR ASTHMA SYMPTOMS (WHEEZING, COUGHING, SHORTNESS OF BREATH, CHEST TIGHTNESS OR PAIN) WAKE YOU UP AT NIGHT OR EARLIER THAN USUAL IN THE MORNING: ONCE OR TWICE

## 2024-12-05 ENCOUNTER — OFFICE VISIT (OUTPATIENT)
Dept: PULMONOLOGY | Facility: CLINIC | Age: 64
End: 2024-12-05
Attending: NURSE PRACTITIONER
Payer: COMMERCIAL

## 2024-12-05 VITALS
OXYGEN SATURATION: 96 % | SYSTOLIC BLOOD PRESSURE: 122 MMHG | DIASTOLIC BLOOD PRESSURE: 70 MMHG | HEART RATE: 80 BPM | WEIGHT: 190 LBS | BODY MASS INDEX: 36.15 KG/M2

## 2024-12-05 DIAGNOSIS — J45.50 SEVERE PERSISTENT ASTHMA WITHOUT COMPLICATION (H): ICD-10-CM

## 2024-12-05 DIAGNOSIS — J45.51 SEVERE PERSISTENT ASTHMA WITH EXACERBATION (H): Primary | ICD-10-CM

## 2024-12-05 DIAGNOSIS — M06.9 RHEUMATOID ARTHRITIS, INVOLVING UNSPECIFIED SITE, UNSPECIFIED WHETHER RHEUMATOID FACTOR PRESENT (H): ICD-10-CM

## 2024-12-05 RX ORDER — PREDNISONE 20 MG/1
40 TABLET ORAL DAILY
Qty: 10 TABLET | Refills: 0 | Status: SHIPPED | OUTPATIENT
Start: 2024-12-05 | End: 2024-12-10

## 2024-12-05 NOTE — PATIENT INSTRUCTIONS
It was a pleasure to see you in clinic today.   Here is what we discussed:    Start prednisone 40mg x7 days.  Have the chest CT done.  Continue Symbicort two puffs twice daily, rinse/gargle after use.  Continue Spiriva one puff daily.  Continue Albuterol inhaler or Duonebs every 4-6 hours as needed for shortness of breath or wheezing.  Call my nurse, Aníbal ([phone]) with any change or worsening of your breathing.  Follow-up in 6 months.     Cristiane Marcum, CNP  Pulmonary Medicine  [facility]  [phone]

## 2024-12-05 NOTE — PROGRESS NOTES
Pulmonary Clinic Follow-Up          Assessment/Plan:     Amy Sands is a 64 year old non-smoker with history of asthma/chronic bronchitis, chronic TB, RA on methotrexate, fibromyalgia, presenting today for 6 month follow-up.    Severe persistent asthma  Pulmonary function testing on 8/8/2022 showed normal spirometry, normal lung volumes, normal diffusion capacity without clinically significant bronchodilator response.  CT chest on 7/30/2021 that showed no evidence of interstitial lung disease that may be associated with rheumatoid arthritis.  This was repeated in 6/2023, no evidence of ILD.  Triggers for breathing include cold and humid weather.   ACT score 20 today.  One URI since last visit.  People around her have noticed worsening heavy breathing.    Plan:  - prednisone 40mg x7 days for lingering symptoms following exacerbation/URI.  - we will get updated chest CT, due to lingering symptoms since August and worsening SOB/heavy breathing.  - continue Symbicort (budesonide/formoterol) 160/4.5, two puffs BID, rinse/gargle after use.  - continue Spiriva (tiotropium), one inhalation daily.  - she will contact insurance to check on Trelegy Ellipta, we may be able to simplify her regimen with this, if it is cost effective.  - continue Albuterol inhaler or Duonebs PRN  - she is UTD with COVID vaccines and booster, annual influenza vaccine, RSV vaccine, and pneumococcal vaccine.  - any change in breathing from baseline, consider chest CT scan d/t hx of RA and methotrexate.  - Action plan:  Prednisone 40mg x7 days + azithromycin x5 days.     Rheumatoid arthritis   Followed at [facility], by Dr Banegas.  Last visit 6/2024.  No evidence of ILD as of 6/2023 chest CT.  On methotrexate 25mg weekly.  Plan:  - continue following with rheumatology    Follow-up  - 6 months      Cristiane Marcum CNP  Pulmonary Medicine  [facility]  [phone]       CC:     Asthma follow-up     HPI:      Amy Sands is a 64 year old non-smoker with history of asthma/chronic bronchitis, chronic TB, RA on methotrexate, fibromyalgia, presenting today for 6 month follow-up.    Having some worse heavy breathing.  Others around her have noticed this while she is walking.  URI in August, still has mucous.  Did take action plan, helped but still having symptoms.  Was using duonebs once daily while sick with URI, now not as much.            11/27/2023     6:37 PM 5/31/2024    11:34 AM 12/4/2024    10:17 AM   ACT Total Scores   ACT TOTAL SCORE (Goal Greater than or Equal to 20) 15 18 20    In the past 12 months, how many times did you visit the emergency room for your asthma without being admitted to the hospital? 0  0  0    In the past 12 months, how many times were you hospitalized overnight because of your asthma? 0  0  0        Patient-reported            ROS:     6-point ROS performed and is negative aside from those listed in HPI.     Medical history:     Past Medical History:   Diagnosis Date    Arthritis     Arthritis     Benign Pigmented Nevus     Created by Conversion  Replacement Utility updated for latest IMO load       Chest Pain     Created by Conversion       Chronic bronchitis (H)     Chronic bronchitis (H)     Chronic Bronchitis With Acute Exacerbation     Created by Conversion       Fibromyalgia     Fibromyalgia     History of anesthesia complications     HARD WAKEUP AND LOW BP    History of anesthesia complications     HARD WAKEUP AND LOW BP    Hydronephrosis with urinary obstruction due to ureteral calculus     Joint Pain, Localized In The Knee     Created by Conversion       Kidney stone     Kidney stone     Obesity (BMI 35.0-39.9) with comorbidity (H) 06/02/2020    PONV (postoperative nausea and vomiting)     motion sickness    PONV (postoperative nausea and vomiting)     motion sickness    Pre-op exam 01/11/2019    Primary osteoarthritis of left knee 01/07/2016    Productive cough 06/14/2018     Rheumatoid arthritis involving right wrist, unspecified whether rheumatoid factor present (H) 07/06/2021    Right flank discomfort 03/20/2019    Right Rotator Cuff Tendonitis     Created by Conversion  Replacement Utility updated for latest IMO load       Rosacea     Rosacea     Rotator cuff tendonitis, right     Rotator cuff tendonitis, right     Sleep apnea     DOESNT USE CPAP    Sleep apnea     DOESNT USE CPAP    TB lung, latent 06/14/2018    Total knee replacement status 01/09/2016    Urinary urgency 03/20/2019    Visit for screening mammogram 06/14/2018     Past Surgical History:   Procedure Laterality Date    ARTHROSCOPY KNEE Left     COMBINED CYSTOSCOPY, INSERT STENT URETER(S) Right 3/25/2019    Procedure: CYSTOSCOPY, RIGHT URETEROSCOPY STENT INSERTION AND RETROGRADE PYELOGRAM;  Surgeon: Kenneth Lopez MD;  Location: [facility];  Service: Urology    HYSTERECTOMY      OOPHORECTOMY      OTHER SURGICAL HISTORY      lysis of adhesions for bowel obstruction x 2    RELEASE CARPAL TUNNEL Bilateral     ZZC TOTAL KNEE ARTHROPLASTY Left 1/7/2016    Procedure: TOTAL KNEE  ARTHROPLASTY;  Surgeon: Boubacar Cortez MD;  Location: [facility];  Service: Orthopedics     Social History     Tobacco Use    Smoking status: Never     Passive exposure: Yes    Smokeless tobacco: Never   Vaping Use    Vaping status: Never Used   Substance Use Topics    Alcohol use: No    Drug use: No     Current Outpatient Medications   Medication Sig Dispense Refill    albuterol (PROAIR HFA) 90 mcg/actuation inhaler [ALBUTEROL (PROAIR HFA) 90 MCG/ACTUATION INHALER] Inhale 2 puffs every 6 (six) hours as needed for wheezing. 1 Inhaler 1    budesonide-formoterol (SYMBICORT) 160-4.5 MCG/ACT Inhaler Inhale 2 puffs into the lungs 2 times daily 10.2 g 11    folic acid (FOLVITE) 1 MG tablet [FOLIC ACID (FOLVITE) 1 MG TABLET] Take 1 mg by mouth.      guaiFENesin-codeine (ROBITUSSIN AC) 100-10 MG/5ML solution Take 5-10 mLs by mouth  every 4 hours as needed for cough 473 mL 0    ipratropium - albuterol 0.5 mg/2.5 mg/3 mL (DUONEB) 0.5-2.5 (3) MG/3ML neb solution Take 1 vial (3 mLs) by nebulization every 6 hours as needed for shortness of breath, wheezing or cough Take 3-4 times daily while acutely ill, then back off to q6h PRN 90 mL 4    methotrexate 2.5 MG tablet Take 10 tablets by mouth once a week      pregabalin (LYRICA) 75 MG capsule Take 75 mg by mouth daily      tiotropium (SPIRIVA HANDIHALER) 18 MCG inhaled capsule Inhale 1 capsule (18 mcg) into the lungs daily 90 capsule 4     No current facility-administered medications for this visit.        Physical Exam:     /70   Pulse 80   Wt 86.2 kg (190 lb)   SpO2 96%   BMI 36.15 kg/m    Gen: adult female, appears in NAD  HEENT: clear conjunctivae, moist mucous membranes  CV: RRR, no M/G/R  Resp: intermittent expiratory wheeze.  Respirations even and unlabored.  On RA.  No cough.  Skin: no apparent rashes on visible skin  Ext: no cyanosis, clubbing or edema  Neuro: alert and answering questions appropriately       Data:     Chest CT 6/8/23:  IMPRESSION:   1.  No fibrotic interstitial lung disease.   2.  Minimal basilar cylindrical bronchiectasis. No significant airway thickening. Cannot exclude modest gas trapping on expiration.  3.  Small hiatus hernia.  4.  Cholelithiasis.    EXAM: CT CHEST HI-RESOLUTION WO CONTRAST  LOCATION: [facility]  DATE/TIME: 7/30/2021 8:04 AM  INDICATION: Dyspnea, chronic, unclear etiology  COMPARISON: 10/17/2012  TECHNIQUE: High resolution images were obtained through the chest during inspiration with select expiratory views. Prone imaging was performed. Multiplanar reformats were obtained. Dose reduction techniques were used.  CONTRAST: None.  FINDINGS:   LUNGS AND PLEURA: No interstitial thickening, honeycombing, fibrosis, bronchiectasis, air trapping or groundglass opacities. No pleural effusion. A few stable benign scattered  pulmonary nodules measuring 0.3 cm or less.  MEDIASTINUM/AXILLAE: Normal caliber aorta. No pericardial effusion.  CORONARY ARTERY CALCIFICATION: Mild.  UPPER ABDOMEN: Hepatic steatosis. Cholelithiasis.  MUSCULOSKELETAL: Mild degenerative changes of the spine.                                                            IMPRESSION:   1.  No evidence of interstitial lung disease.  2.  Hepatic steatosis.  3.  Cholelithiasis.      PFTs 8/2022:  The FVC, FEV1, FEV1/FVC ratio and EWJ03-42% are within normal limits.  The inspiratory flow rates are within normal limits.  Lung volumes are within normal limits.  Following administration of bronchodilators, there is no significant response.  The   diffusing capacity is normal.   The results are within normal limits.   IMPRESSION:   Normal Pulmonary Function

## 2024-12-13 ENCOUNTER — HOSPITAL ENCOUNTER (OUTPATIENT)
Dept: CT IMAGING | Facility: CLINIC | Age: 64
Discharge: HOME | End: 2024-12-13
Attending: NURSE PRACTITIONER | Admitting: NURSE PRACTITIONER
Payer: COMMERCIAL

## 2024-12-13 DIAGNOSIS — M06.9 RHEUMATOID ARTHRITIS, INVOLVING UNSPECIFIED SITE, UNSPECIFIED WHETHER RHEUMATOID FACTOR PRESENT (H): ICD-10-CM

## 2024-12-13 DIAGNOSIS — J45.51 SEVERE PERSISTENT ASTHMA WITH EXACERBATION (H): ICD-10-CM

## 2024-12-13 PROCEDURE — 71250 CT THORAX DX C-: CPT

## 2025-01-07 ENCOUNTER — TELEPHONE (OUTPATIENT)
Dept: PULMONOLOGY | Facility: CLINIC | Age: 65
End: 2025-01-07
Payer: COMMERCIAL

## 2025-01-13 ENCOUNTER — OFFICE VISIT (OUTPATIENT)
Dept: FAMILY MEDICINE | Facility: CLINIC | Age: 65
End: 2025-01-13
Payer: COMMERCIAL

## 2025-01-13 VITALS
BODY MASS INDEX: 36.27 KG/M2 | WEIGHT: 192.1 LBS | RESPIRATION RATE: 20 BRPM | TEMPERATURE: 98.1 F | HEART RATE: 83 BPM | SYSTOLIC BLOOD PRESSURE: 126 MMHG | HEIGHT: 61 IN | DIASTOLIC BLOOD PRESSURE: 76 MMHG | OXYGEN SATURATION: 96 %

## 2025-01-13 DIAGNOSIS — R10.12 LUQ ABDOMINAL PAIN: Primary | ICD-10-CM

## 2025-01-13 PROBLEM — R10.9 NONSPECIFIC ABDOMINAL PAIN: Status: ACTIVE | Noted: 2023-11-07

## 2025-01-13 PROBLEM — R19.8 DISORDER OF ABDOMEN: Status: ACTIVE | Noted: 2025-01-13

## 2025-01-13 PROBLEM — R10.9 ABDOMINAL DISCOMFORT: Status: ACTIVE | Noted: 2025-01-13

## 2025-01-13 LAB
ALBUMIN SERPL BCG-MCNC: 4.1 G/DL (ref 3.5–5.2)
ALBUMIN UR-MCNC: NEGATIVE MG/DL
ALP SERPL-CCNC: 51 U/L (ref 40–150)
ALT SERPL W P-5'-P-CCNC: 17 U/L (ref 0–50)
ANION GAP SERPL CALCULATED.3IONS-SCNC: 8 MMOL/L (ref 7–15)
APPEARANCE UR: CLEAR
AST SERPL W P-5'-P-CCNC: 27 U/L (ref 0–45)
BACTERIA #/AREA URNS HPF: ABNORMAL /HPF
BILIRUB SERPL-MCNC: 0.6 MG/DL
BILIRUB UR QL STRIP: NEGATIVE
BUN SERPL-MCNC: 15.7 MG/DL (ref 8–23)
CALCIUM SERPL-MCNC: 9.8 MG/DL (ref 8.8–10.4)
CHLORIDE SERPL-SCNC: 104 MMOL/L (ref 98–107)
COLOR UR AUTO: YELLOW
CREAT SERPL-MCNC: 0.76 MG/DL (ref 0.51–0.95)
EGFRCR SERPLBLD CKD-EPI 2021: 86 ML/MIN/1.73M2
ERYTHROCYTE [DISTWIDTH] IN BLOOD BY AUTOMATED COUNT: 13.5 % (ref 10–15)
GLUCOSE SERPL-MCNC: 93 MG/DL (ref 70–99)
GLUCOSE UR STRIP-MCNC: NEGATIVE MG/DL
HCO3 SERPL-SCNC: 27 MMOL/L (ref 22–29)
HCT VFR BLD AUTO: 39.5 % (ref 35–47)
HGB BLD-MCNC: 13.4 G/DL (ref 11.7–15.7)
HGB UR QL STRIP: NEGATIVE
KETONES UR STRIP-MCNC: NEGATIVE MG/DL
LEUKOCYTE ESTERASE UR QL STRIP: NEGATIVE
LIPASE SERPL-CCNC: 32 U/L (ref 13–60)
MCH RBC QN AUTO: 31.4 PG (ref 26.5–33)
MCHC RBC AUTO-ENTMCNC: 33.9 G/DL (ref 31.5–36.5)
MCV RBC AUTO: 93 FL (ref 78–100)
NITRATE UR QL: NEGATIVE
PH UR STRIP: 7 [PH] (ref 5–8)
PLATELET # BLD AUTO: 237 10E3/UL (ref 150–450)
POTASSIUM SERPL-SCNC: 5.3 MMOL/L (ref 3.4–5.3)
PROT SERPL-MCNC: 7.1 G/DL (ref 6.4–8.3)
RBC # BLD AUTO: 4.27 10E6/UL (ref 3.8–5.2)
RBC #/AREA URNS AUTO: ABNORMAL /HPF
SODIUM SERPL-SCNC: 139 MMOL/L (ref 135–145)
SP GR UR STRIP: 1.01 (ref 1–1.03)
SQUAMOUS #/AREA URNS AUTO: ABNORMAL /LPF
UROBILINOGEN UR STRIP-ACNC: 0.2 E.U./DL
WBC # BLD AUTO: 7 10E3/UL (ref 4–11)
WBC #/AREA URNS AUTO: ABNORMAL /HPF

## 2025-01-13 PROCEDURE — 99214 OFFICE O/P EST MOD 30 MIN: CPT | Performed by: FAMILY MEDICINE

## 2025-01-13 PROCEDURE — 85027 COMPLETE CBC AUTOMATED: CPT | Performed by: FAMILY MEDICINE

## 2025-01-13 PROCEDURE — 81001 URINALYSIS AUTO W/SCOPE: CPT | Performed by: FAMILY MEDICINE

## 2025-01-13 PROCEDURE — 80053 COMPREHEN METABOLIC PANEL: CPT | Performed by: FAMILY MEDICINE

## 2025-01-13 PROCEDURE — 36415 COLL VENOUS BLD VENIPUNCTURE: CPT | Performed by: FAMILY MEDICINE

## 2025-01-13 PROCEDURE — 83690 ASSAY OF LIPASE: CPT | Performed by: FAMILY MEDICINE

## 2025-01-13 ASSESSMENT — PAIN SCALES - GENERAL: PAINLEVEL_OUTOF10: NO PAIN (0)

## 2025-01-13 NOTE — PROGRESS NOTES
"Amy Sands  /76   Pulse 83   Temp 98.1  F (36.7  C) (Oral)   Resp 20   Ht 1.543 m (5' 0.75\")   Wt 87.1 kg (192 lb 1.6 oz)   SpO2 96%   BMI 36.60 kg/m       Assessment/Plan:                Amy was seen today for flank pain.    Diagnoses and all orders for this visit:    LUQ abdominal pain  -     CBC with platelets; Future  -     Comprehensive metabolic panel (BMP + Alb, Alk Phos, ALT, AST, Total. Bili, TP); Future  -     UA with Microscopic reflex to Culture - lab collect; Future  -     Lipase; Future  -     CT Abdomen Pelvis w/o & w Contrast; Future  -     CBC with platelets  -     Comprehensive metabolic panel (BMP + Alb, Alk Phos, ALT, AST, Total. Bili, TP)  -     UA with Microscopic reflex to Culture - lab collect  -     Lipase       DISCUSSION  Obtain labs as above.  Pending lab test results we will plan on a CT scan to evaluate further.  If lab tests indicate a potential specific cause will pursue evaluation and/or of that more specifically if indicated.  Patient is aware to seek emergent treatment should she have any abrupt change in her symptoms.  Based on her vital signs send description of the clinical situation I do not feel that there is a high likelihood of any emergency or life-threatening situation developing from this above patient understands the potential gravity of this ongoing pain.  Subjective:     HPI:    Amy Sands is a 64 year old female she has a medical history that includes rheumatoid arthritis, fibromyalgia, severe persistent asthma and known diverticulosis.    She is here today concerned regarding left upper quadrant abdominal pain that has been present for about a month.  Patient states the pain is present most of the time, and has not worsened significantly but it has also not improved or resolved.  She denies any change to appetite she does not feel nauseated she has no vomiting diarrhea or constipation.  No blood with bowel movements.  No urinary symptoms, no " hematuria, dysuria, frequency or other similar problems.  She has not had any unexpected weight loss.  She did have a CT scan of the chest performed about a month ago which did not show any significant problems we reviewed and discussed that.    ROS:  Complete review of systems is obtained.  Other than the specific considerations noted above complete review of systems is negative.      Objective:   Medications:  Current Outpatient Medications   Medication Sig Dispense Refill    albuterol (PROAIR HFA) 90 mcg/actuation inhaler [ALBUTEROL (PROAIR HFA) 90 MCG/ACTUATION INHALER] Inhale 2 puffs every 6 (six) hours as needed for wheezing. 1 Inhaler 1    budesonide-formoterol (SYMBICORT) 160-4.5 MCG/ACT Inhaler Inhale 2 puffs into the lungs 2 times daily 10.2 g 11    folic acid (FOLVITE) 1 MG tablet [FOLIC ACID (FOLVITE) 1 MG TABLET] Take 1 mg by mouth.      guaiFENesin-codeine (ROBITUSSIN AC) 100-10 MG/5ML solution Take 5-10 mLs by mouth every 4 hours as needed for cough 473 mL 0    ipratropium - albuterol 0.5 mg/2.5 mg/3 mL (DUONEB) 0.5-2.5 (3) MG/3ML neb solution Take 1 vial (3 mLs) by nebulization every 6 hours as needed for shortness of breath, wheezing or cough Take 3-4 times daily while acutely ill, then back off to q6h PRN 90 mL 4    methotrexate 2.5 MG tablet Take 10 tablets by mouth once a week      pregabalin (LYRICA) 75 MG capsule Take 75 mg by mouth daily      tiotropium (SPIRIVA HANDIHALER) 18 MCG inhaled capsule Inhale 1 capsule (18 mcg) into the lungs daily 90 capsule 4     No current facility-administered medications for this visit.        Allergies:     Allergies   Allergen Reactions    Dilaudid [Hydromorphone] Nausea and Vomiting    Hydrocodone Nausea and Vomiting    Toradol [Ketorolac] Nausea and Vomiting        Social History     Socioeconomic History    Marital status:      Spouse name: Not on file    Number of children: Not on file    Years of education: Not on file    Highest education level:  Not on file   Occupational History    Not on file   Tobacco Use    Smoking status: Never     Passive exposure: Yes    Smokeless tobacco: Never   Vaping Use    Vaping status: Never Used   Substance and Sexual Activity    Alcohol use: No    Drug use: No    Sexual activity: Not on file   Other Topics Concern    Not on file   Social History Narrative    Not on file     Social Drivers of Health     Financial Resource Strain: High Risk (9/26/2023)    Financial Resource Strain     Within the past 12 months, have you or your family members you live with been unable to get utilities (heat, electricity) when it was really needed?: Yes   Food Insecurity: Low Risk  (9/26/2023)    Food Insecurity     Within the past 12 months, did you worry that your food would run out before you got money to buy more?: No     Within the past 12 months, did the food you bought just not last and you didn t have money to get more?: No   Transportation Needs: Low Risk  (9/26/2023)    Transportation Needs     Within the past 12 months, has lack of transportation kept you from medical appointments, getting your medicines, non-medical meetings or appointments, work, or from getting things that you need?: No   Physical Activity: Not on file   Stress: Not on file   Social Connections: Not on file   Interpersonal Safety: Low Risk  (4/29/2024)    Interpersonal Safety     Do you feel physically and emotionally safe where you currently live?: Yes     Within the past 12 months, have you been hit, slapped, kicked or otherwise physically hurt by someone?: No     Within the past 12 months, have you been humiliated or emotionally abused in other ways by your partner or ex-partner?: No   Housing Stability: Low Risk  (9/26/2023)    Housing Stability     Do you have housing? : Yes     Are you worried about losing your housing?: No       Family History   Problem Relation Age of Onset    Cancer Mother     Cancer Father     Clotting Disorder Sister     Cancer Sister   "   Kidney Disease Brother     Prostate Cancer Brother     Cancer Brother     Urolithiasis No family hx of     Diabetes No family hx of     Gout No family hx of     Heart Disease No family hx of         Most Recent Immunizations   Administered Date(s) Administered    COVID-19 12+ (Pfizer) 10/11/2024    COVID-19 Bivalent 12+ (Pfizer) 09/15/2022    COVID-19 Bivalent 18+ (Moderna) 09/01/2023    COVID-19 MONOVALENT 12+ (Pfizer) 10/14/2021    Flu 65+ (Fluad) 10/11/2024    Influenza Vaccine 18-64 (Flublok) 09/24/2020    Influenza Vaccine >6 months,quad, PF 09/08/2023    Influenza,INJ,MDCK,PF,Quad >6mo(Flucelvax) 09/24/2020    Pneumococcal 23 valent 11/09/2020    RSV Vaccine (Arexvy) 10/27/2023    TDAP (Adacel,Boostrix) 12/22/2015    Td (Adult), Adsorbed 1959    Tdap (Adult) Unspecified Formulation 1959        Wt Readings from Last 3 Encounters:   01/13/25 87.1 kg (192 lb 1.6 oz)   12/05/24 86.2 kg (190 lb)   06/05/24 84.8 kg (187 lb)        BP Readings from Last 6 Encounters:   01/13/25 126/76   12/05/24 122/70   06/05/24 128/82   04/29/24 (!) 140/82   12/04/23 130/82   09/26/23 126/76        PHYSICAL EXAM:    /76   Pulse 83   Temp 98.1  F (36.7  C) (Oral)   Resp 20   Ht 1.543 m (5' 0.75\")   Wt 87.1 kg (192 lb 1.6 oz)   SpO2 96%   BMI 36.60 kg/m         General Appearance:    Alert, cooperative, no distress   Eyes:   No scleral icterus or conjunctival irritation       Lungs:     Clear to auscultation bilaterally, respirations unlabored, no wheezes or crackles   Heart:    Regular rate and rhythm,  No murmur   Abdomen:    Soft, no distention, there is focal tenderness in the left upper quadrant no rebound tenderness no guarding.  No palpable organomegaly no palpable masses.     Extremities:  No edema, no joint swelling or redness, no evidence of any injuries   Skin:  No concerning skin findings, no suspicious moles, no rashes   Neurologic:  On gross examination there is no motor or sensory deficit.  " Patient walks with a normal gait       Answers submitted by the patient for this visit:  General Questionnaire (Submitted on 1/13/2025)  Chief Complaint: Chronic problems general questions HPI Form  How many days per week do you miss taking your medication?: 0  General Concern (Submitted on 1/13/2025)  Chief Complaint: Chronic problems general questions HPI Form  What is the reason for your visit today?: Upper left abdominal pain, stomach pain  When did your symptoms begin?: 3-4 weeks ago  How would you describe these symptoms?: Mild  Are your symptoms:: Staying the same  Questionnaire about: Chronic problems general questions HPI Form (Submitted on 1/13/2025)  Chief Complaint: Chronic problems general questions HPI Form

## 2025-01-21 ENCOUNTER — HOSPITAL ENCOUNTER (OUTPATIENT)
Dept: CT IMAGING | Facility: HOSPITAL | Age: 65
Discharge: HOME | End: 2025-01-21
Attending: FAMILY MEDICINE
Payer: COMMERCIAL

## 2025-01-21 DIAGNOSIS — R10.12 LUQ ABDOMINAL PAIN: ICD-10-CM

## 2025-01-21 PROCEDURE — 250N000009 HC RX 250: Performed by: FAMILY MEDICINE

## 2025-01-21 PROCEDURE — 250N000011 HC RX IP 250 OP 636: Performed by: FAMILY MEDICINE

## 2025-01-21 PROCEDURE — 74177 CT ABD & PELVIS W/CONTRAST: CPT

## 2025-01-21 RX ORDER — IOPAMIDOL 755 MG/ML
90 INJECTION, SOLUTION INTRAVASCULAR ONCE
Status: COMPLETED | OUTPATIENT
Start: 2025-01-21 | End: 2025-01-21

## 2025-01-21 RX ADMIN — IOPAMIDOL 90 ML: 755 INJECTION, SOLUTION INTRAVENOUS at 09:33

## 2025-01-21 RX ADMIN — SODIUM CHLORIDE 72 ML: 9 INJECTION, SOLUTION INTRAVENOUS at 09:33

## 2025-01-30 ENCOUNTER — TRANSFERRED RECORDS (OUTPATIENT)
Dept: HEALTH INFORMATION MANAGEMENT | Facility: CLINIC | Age: 65
End: 2025-01-30
Payer: COMMERCIAL

## 2025-02-01 ENCOUNTER — PATIENT OUTREACH (OUTPATIENT)
Dept: CARE COORDINATION | Facility: CLINIC | Age: 65
End: 2025-02-01
Payer: COMMERCIAL

## 2025-02-19 ENCOUNTER — TELEPHONE (OUTPATIENT)
Dept: PULMONOLOGY | Facility: CLINIC | Age: 65
End: 2025-02-19
Payer: COMMERCIAL

## 2025-02-20 ENCOUNTER — TRANSFERRED RECORDS (OUTPATIENT)
Dept: HEALTH INFORMATION MANAGEMENT | Facility: CLINIC | Age: 65
End: 2025-02-20

## 2025-04-29 ENCOUNTER — TRANSFERRED RECORDS (OUTPATIENT)
Dept: HEALTH INFORMATION MANAGEMENT | Facility: CLINIC | Age: 65
End: 2025-04-29
Payer: COMMERCIAL

## 2025-05-01 ENCOUNTER — TRANSFERRED RECORDS (OUTPATIENT)
Dept: HEALTH INFORMATION MANAGEMENT | Facility: CLINIC | Age: 65
End: 2025-05-01
Payer: COMMERCIAL

## 2025-05-09 ENCOUNTER — TRANSFERRED RECORDS (OUTPATIENT)
Dept: HEALTH INFORMATION MANAGEMENT | Facility: CLINIC | Age: 65
End: 2025-05-09
Payer: COMMERCIAL

## 2025-05-23 ENCOUNTER — TRANSFERRED RECORDS (OUTPATIENT)
Dept: HEALTH INFORMATION MANAGEMENT | Facility: CLINIC | Age: 65
End: 2025-05-23

## 2025-05-23 ENCOUNTER — RESULTS FOLLOW-UP (OUTPATIENT)
Dept: URGENT CARE | Facility: CLINIC | Age: 65
End: 2025-05-23

## 2025-06-05 ENCOUNTER — TELEPHONE (OUTPATIENT)
Dept: PULMONOLOGY | Facility: CLINIC | Age: 65
End: 2025-06-05
Payer: COMMERCIAL

## 2025-06-05 DIAGNOSIS — J45.51 SEVERE PERSISTENT ASTHMA WITH ACUTE EXACERBATION (H): Primary | ICD-10-CM

## 2025-06-05 DIAGNOSIS — R06.09 DYSPNEA ON EXERTION: ICD-10-CM

## 2025-06-05 RX ORDER — AZITHROMYCIN 250 MG/1
TABLET, FILM COATED ORAL
Qty: 6 TABLET | Refills: 0 | Status: SHIPPED | OUTPATIENT
Start: 2025-06-05 | End: 2025-06-10

## 2025-06-05 RX ORDER — PREDNISONE 20 MG/1
40 TABLET ORAL DAILY
Qty: 14 TABLET | Refills: 0 | Status: SHIPPED | OUTPATIENT
Start: 2025-06-05 | End: 2025-06-12

## 2025-06-05 NOTE — TELEPHONE ENCOUNTER
Spoke with Amy. She is having a flare of symptoms. She reports cough, chest congestion, slight shortness of breath, and yellow phlegm. She did have a fever one day but it is gone now. Will send in her action plan per Cristiane Marcum CNP:  - Action plan:  Prednisone 40mg x7 days + azithromycin x5 days.

## 2025-06-05 NOTE — TELEPHONE ENCOUNTER
[facility] Call Center    Phone Message    May a detailed message be left on voicemail: yes     Reason for Call: Symptoms or Concerns     If patient has red-flag symptoms, warm transfer to triage line    Current symptom or concern: Respiratory Flare up requesting Medications for treatment. Prednisone and Azithromycin     Pharmacy : [id number] - [place], [place] - [address]  P: [phone]  F: [phone]  Address    [address] [address]          Symptoms have been present for:  4 day(s)    Has patient previously been seen for this? Yes      Action Taken: Other: PULM     Travel Screening: Not Applicable     Date of Service:

## 2025-06-07 ASSESSMENT — ASTHMA QUESTIONNAIRES
QUESTION_2 LAST FOUR WEEKS HOW OFTEN HAVE YOU HAD SHORTNESS OF BREATH: THREE TO SIX TIMES A WEEK
QUESTION_1 LAST FOUR WEEKS HOW MUCH OF THE TIME DID YOUR ASTHMA KEEP YOU FROM GETTING AS MUCH DONE AT WORK, SCHOOL OR AT HOME: A LITTLE OF THE TIME
ACT_TOTALSCORE: 15
QUESTION_4 LAST FOUR WEEKS HOW OFTEN HAVE YOU USED YOUR RESCUE INHALER OR NEBULIZER MEDICATION (SUCH AS ALBUTEROL): ONE OR TWO TIMES PER DAY
QUESTION_5 LAST FOUR WEEKS HOW WOULD YOU RATE YOUR ASTHMA CONTROL: SOMEWHAT CONTROLLED
QUESTION_3 LAST FOUR WEEKS HOW OFTEN DID YOUR ASTHMA SYMPTOMS (WHEEZING, COUGHING, SHORTNESS OF BREATH, CHEST TIGHTNESS OR PAIN) WAKE YOU UP AT NIGHT OR EARLIER THAN USUAL IN THE MORNING: ONCE A WEEK

## 2025-06-12 ENCOUNTER — OFFICE VISIT (OUTPATIENT)
Dept: PULMONOLOGY | Facility: CLINIC | Age: 65
End: 2025-06-12
Payer: COMMERCIAL

## 2025-06-12 VITALS
WEIGHT: 194 LBS | SYSTOLIC BLOOD PRESSURE: 130 MMHG | HEART RATE: 79 BPM | DIASTOLIC BLOOD PRESSURE: 76 MMHG | OXYGEN SATURATION: 97 % | BODY MASS INDEX: 36.96 KG/M2

## 2025-06-12 DIAGNOSIS — J45.50 SEVERE PERSISTENT ASTHMA WITHOUT COMPLICATION (H): Primary | ICD-10-CM

## 2025-06-12 DIAGNOSIS — J45.51 SEVERE PERSISTENT ASTHMA WITH EXACERBATION (H): ICD-10-CM

## 2025-06-12 DIAGNOSIS — M06.9 RHEUMATOID ARTHRITIS, INVOLVING UNSPECIFIED SITE, UNSPECIFIED WHETHER RHEUMATOID FACTOR PRESENT (H): ICD-10-CM

## 2025-06-12 RX ORDER — PREDNISONE 20 MG/1
40 TABLET ORAL DAILY
Qty: 14 TABLET | Refills: 0 | Status: SHIPPED | OUTPATIENT
Start: 2025-06-12 | End: 2025-06-19

## 2025-06-12 RX ORDER — AZITHROMYCIN 250 MG/1
TABLET, FILM COATED ORAL
Qty: 6 TABLET | Refills: 0 | Status: SHIPPED | OUTPATIENT
Start: 2025-06-12 | End: 2025-06-17

## 2025-06-12 NOTE — PATIENT INSTRUCTIONS
It was a pleasure to see you in clinic today.   Here is what we discussed:    Continue Symbicort two puffs twice daily, rinse/gargle after use.   Continue Spiriva one puff daily.   Continue Albuterol or Duonebs every 4-6 hours as needed for shortness of breath or wheezing.  Call my nurse, Aníbal ([phone]) with any change or worsening of your breathing.  You can start the prednisone + azithromycin if you need it.   Follow-up in 6 months.     Cristiane Marcum, CNP  Pulmonary Medicine  [facility]  [phone]

## 2025-06-12 NOTE — PROGRESS NOTES
Pulmonary Clinic Follow-Up          Assessment/Plan:     Amy Sands is a 64 year old non-smoker with history of asthma/chronic bronchitis, chronic TB, RA on methotrexate, fibromyalgia, presenting today for 6 month follow-up.    Severe persistent asthma  Pulmonary nodules  Pulmonary function testing on 8/8/2022 showed normal spirometry, normal lung volumes, normal diffusion capacity without clinically significant bronchodilator response.  Multiple chest CTs over the years, without evidence of ILD related to RA.  Last chest CT 12/2024 - multiple sub-centimeter nodules, no follow-up required.  Triggers for breathing include cold and humid weather.   She had a recent exacerbation but is recovered after prednisone + azithromycin.     Plan:  - continue Symbicort (budesonide/formoterol) 160/4.5, two puffs BID, rinse/gargle after use.  - continue Spiriva Handihaler (tiotropium), one inhalation daily.  She denies any issues with administration, keep an eye on this as the Handihaler is notoriously difficult to inhale.   - we previously discussed simplifying regimen with Yovanny, she was going to contact insurance but she is happy with her regimen as is currently.   - continue Albuterol inhaler or Duonebs PRN  - she is UTD with COVID vaccines and booster, annual influenza vaccine, RSV vaccine, and pneumococcal vaccine.  - any change in breathing from baseline, consider chest CT scan d/t hx of RA and methotrexate.  - Action plan:  Prednisone 40mg x7 days + azithromycin x5 days.     Rheumatoid arthritis   Followed at [facility], by Dr Banegas.  Last visit 6/2024.  No evidence of ILD as of 12/2024 chest CT.  On methotrexate 25mg weekly.  Plan:  - continue following with rheumatology    Follow-up  - 6 months, per patient request      Cristiane Marcum CNP  Pulmonary Medicine  [facility]  [phone]       CC:     Asthma follow-up     HPI:     Amy Sands is a 64 year old non-smoker with  history of asthma/chronic bronchitis, chronic TB, RA on methotrexate, fibromyalgia, presenting today for 6 month follow-up.    Feels much improved after action plan.   Continues on Spiriva + Symbicort.  No issues inhaling spiriva.   Was using duonebs with recent exacerbation.  Now just once/day.   Helps get junk out.  Not often albuterol use, once/week.               5/31/2024    11:34 AM 12/4/2024    10:17 AM 6/7/2025     9:24 AM   ACT Total Scores   ACT TOTAL SCORE (Goal Greater than or Equal to 20) 18 20  15    In the past 12 months, how many times did you visit the emergency room for your asthma without being admitted to the hospital? 0 0 0   In the past 12 months, how many times were you hospitalized overnight because of your asthma? 0 0 0       Patient-reported            ROS:     6-point ROS performed and is negative aside from those listed in HPI.     Medical history:     Past Medical History:   Diagnosis Date    Arthritis     Arthritis     Benign Pigmented Nevus     Created by Conversion  Replacement Utility updated for latest IMO load       Chest Pain     Created by Conversion       Chronic bronchitis (H)     Chronic bronchitis (H)     Chronic Bronchitis With Acute Exacerbation     Created by Conversion       Fibromyalgia     Fibromyalgia     History of anesthesia complications     HARD WAKEUP AND LOW BP    History of anesthesia complications     HARD WAKEUP AND LOW BP    Hydronephrosis with urinary obstruction due to ureteral calculus     Joint Pain, Localized In The Knee     Created by Conversion       Kidney stone     Kidney stone     Obesity (BMI 35.0-39.9) with comorbidity (H) 06/02/2020    PONV (postoperative nausea and vomiting)     motion sickness    PONV (postoperative nausea and vomiting)     motion sickness    Pre-op exam 01/11/2019    Primary osteoarthritis of left knee 01/07/2016    Productive cough 06/14/2018    Rheumatoid arthritis involving right wrist, unspecified whether rheumatoid factor  present (H) 07/06/2021    Right flank discomfort 03/20/2019    Right Rotator Cuff Tendonitis     Created by Conversion  Replacement Utility updated for latest IMO load       Rosacea     Rosacea     Rotator cuff tendonitis, right     Rotator cuff tendonitis, right     Sleep apnea     DOESNT USE CPAP    Sleep apnea     DOESNT USE CPAP    TB lung, latent 06/14/2018    Total knee replacement status 01/09/2016    Urinary urgency 03/20/2019    Visit for screening mammogram 06/14/2018     Past Surgical History:   Procedure Laterality Date    ARTHROSCOPY KNEE Left     COMBINED CYSTOSCOPY, INSERT STENT URETER(S) Right 3/25/2019    Procedure: CYSTOSCOPY, RIGHT URETEROSCOPY STENT INSERTION AND RETROGRADE PYELOGRAM;  Surgeon: Kenneth Lopez MD;  Location: [facility] OR;  Service: Urology    HYSTERECTOMY      OOPHORECTOMY      OTHER SURGICAL HISTORY      lysis of adhesions for bowel obstruction x 2    RELEASE CARPAL TUNNEL Bilateral     ZZC TOTAL KNEE ARTHROPLASTY Left 1/7/2016    Procedure: TOTAL KNEE  ARTHROPLASTY;  Surgeon: Boubacar Cortez MD;  Location: [facility];  Service: Orthopedics     Social History     Tobacco Use    Smoking status: Never     Passive exposure: Yes    Smokeless tobacco: Never   Vaping Use    Vaping status: Never Used   Substance Use Topics    Alcohol use: No    Drug use: No     Current Outpatient Medications   Medication Sig Dispense Refill    albuterol (PROAIR HFA) 90 mcg/actuation inhaler [ALBUTEROL (PROAIR HFA) 90 MCG/ACTUATION INHALER] Inhale 2 puffs every 6 (six) hours as needed for wheezing. 1 Inhaler 1    budesonide-formoterol (SYMBICORT) 160-4.5 MCG/ACT Inhaler Inhale 2 puffs into the lungs 2 times daily 10.2 g 11    folic acid (FOLVITE) 1 MG tablet [FOLIC ACID (FOLVITE) 1 MG TABLET] Take 1 mg by mouth.      guaiFENesin-codeine (ROBITUSSIN AC) 100-10 MG/5ML solution Take 5-10 mLs by mouth every 4 hours as needed for cough 473 mL 0    ipratropium - albuterol 0.5 mg/2.5  mg/3 mL (DUONEB) 0.5-2.5 (3) MG/3ML neb solution Take 1 vial (3 mLs) by nebulization every 6 hours as needed for shortness of breath, wheezing or cough Take 3-4 times daily while acutely ill, then back off to q6h PRN 90 mL 4    methotrexate 2.5 MG tablet Take 10 tablets by mouth once a week      pregabalin (LYRICA) 75 MG capsule Take 75 mg by mouth daily      tiotropium (SPIRIVA HANDIHALER) 18 MCG inhaled capsule Inhale 1 capsule (18 mcg) into the lungs daily 90 capsule 4    predniSONE (DELTASONE) 20 MG tablet Take 2 tablets (40 mg) by mouth daily for 7 days. (Patient not taking: Reported on 6/12/2025) 14 tablet 0     No current facility-administered medications for this visit.        Physical Exam:     /76   Pulse 79   Wt 88 kg (194 lb)   SpO2 97%   BMI 36.96 kg/m    Gen: adult female, appears in NAD  HEENT: clear conjunctivae, moist mucous membranes  Resp: CTA bilaterally.  Respirations even and unlabored.  On RA.  No cough.  Skin: no apparent rashes on visible skin  Ext: no cyanosis, clubbing or edema  Neuro: alert and answering questions appropriately       Data:     Chest CT 6/8/23:  IMPRESSION:   1.  No fibrotic interstitial lung disease.   2.  Minimal basilar cylindrical bronchiectasis. No significant airway thickening. Cannot exclude modest gas trapping on expiration.  3.  Small hiatus hernia.  4.  Cholelithiasis.    EXAM: CT CHEST HI-RESOLUTION WO CONTRAST  LOCATION: [facility]  DATE/TIME: 7/30/2021 8:04 AM  INDICATION: Dyspnea, chronic, unclear etiology  COMPARISON: 10/17/2012  TECHNIQUE: High resolution images were obtained through the chest during inspiration with select expiratory views. Prone imaging was performed. Multiplanar reformats were obtained. Dose reduction techniques were used.  CONTRAST: None.  FINDINGS:   LUNGS AND PLEURA: No interstitial thickening, honeycombing, fibrosis, bronchiectasis, air trapping or groundglass opacities. No pleural effusion. A few  stable benign scattered pulmonary nodules measuring 0.3 cm or less.  MEDIASTINUM/AXILLAE: Normal caliber aorta. No pericardial effusion.  CORONARY ARTERY CALCIFICATION: Mild.  UPPER ABDOMEN: Hepatic steatosis. Cholelithiasis.  MUSCULOSKELETAL: Mild degenerative changes of the spine.                                                            IMPRESSION:   1.  No evidence of interstitial lung disease.  2.  Hepatic steatosis.  3.  Cholelithiasis.      PFTs 8/2022:  The FVC, FEV1, FEV1/FVC ratio and [id number]% are within normal limits.  The inspiratory flow rates are within normal limits.  Lung volumes are within normal limits.  Following administration of bronchodilators, there is no significant response.  The   diffusing capacity is normal.   The results are within normal limits.   IMPRESSION:   Normal Pulmonary Function

## 2025-06-27 ENCOUNTER — TRANSFERRED RECORDS (OUTPATIENT)
Dept: HEALTH INFORMATION MANAGEMENT | Facility: CLINIC | Age: 65
End: 2025-06-27
Payer: COMMERCIAL

## 2025-07-01 ENCOUNTER — OFFICE VISIT (OUTPATIENT)
Dept: PODIATRY | Facility: CLINIC | Age: 65
End: 2025-07-01
Payer: COMMERCIAL

## 2025-07-01 VITALS — BODY MASS INDEX: 32.2 KG/M2 | HEIGHT: 62 IN | WEIGHT: 175 LBS

## 2025-07-01 DIAGNOSIS — M72.2 PLANTAR FASCIITIS OF LEFT FOOT: Primary | ICD-10-CM

## 2025-07-01 DIAGNOSIS — M24.573 EQUINUS CONTRACTURE OF ANKLE: ICD-10-CM

## 2025-07-01 PROCEDURE — 99203 OFFICE O/P NEW LOW 30 MIN: CPT | Mod: 25 | Performed by: PODIATRIST

## 2025-07-01 PROCEDURE — 20550 NJX 1 TENDON SHEATH/LIGAMENT: CPT | Mod: LT | Performed by: PODIATRIST

## 2025-07-01 RX ORDER — TRIAMCINOLONE ACETONIDE 40 MG/ML
40 INJECTION, SUSPENSION INTRA-ARTICULAR; INTRAMUSCULAR ONCE
Status: COMPLETED | OUTPATIENT
Start: 2025-07-01 | End: 2025-07-01

## 2025-07-01 RX ORDER — LIDOCAINE HYDROCHLORIDE 20 MG/ML
1 INJECTION, SOLUTION INFILTRATION; PERINEURAL ONCE
Status: COMPLETED | OUTPATIENT
Start: 2025-07-01 | End: 2025-07-01

## 2025-07-01 RX ADMIN — TRIAMCINOLONE ACETONIDE 40 MG: 40 INJECTION, SUSPENSION INTRA-ARTICULAR; INTRAMUSCULAR at 08:49

## 2025-07-01 RX ADMIN — LIDOCAINE HYDROCHLORIDE 1 ML: 20 INJECTION, SOLUTION INFILTRATION; PERINEURAL at 08:49

## 2025-07-01 NOTE — PATIENT INSTRUCTIONS
What is Plantar Fasciitis?  Plantar Fasciitis also referred to as  heel pain syndrome  is the most common cause cited for pain in heels. Plantar Fasciitis is the pain and inflammation at the point where the flat band of tissue called the plantar fascia connects the heel bone to the toes. Plantar fascia maintains the arch of the foot. Applying pressure on it will make it swollen, weak, and irritated. This will make the heel of your foot to ache when you walk or stand for a prolonged period.    Symptoms  Most people suffering from Plantar Fasciitis experience pain when they walk after resting their feet for a long duration. You may experience less pain and stiffness after a few steps. But your foot may hurt more as the day goes on. It may hurt the most when you climb stairs or after you stand for a long time. Continuous foot pain at night might be due to different problems like arthritis or nerve problems.    Causes  Straining the ligament which supports the arch can cause Plantar Fasciitis. Repeated straining can cause tiny tears in the ligament which lead to pain and swelling. Plantar Fasciitis is very evident in cases of:  Tight Achilles tendon or calf muscles   Running long distances, especially on hard & uneven surfaces   Problems of the foot arch   Sudden obesity   Wearing shoes with soft soles or poor arch support   In-toeing              PRO STRETCH - You can buy this on Amazon      -do once in the morning and evening  -hold for 30 seconds on each foot  -repeat 3 times per foot    What are Prescription Custom Orthotics?  Custom orthotics are specially-made devices designed to support and comfort your feet. Prescription orthotics are crafted for you and no one else. They match the contours of your feet precisely and are designed for the way you move. Orthotics are only manufactured after a podiatrist has conducted a complete evaluation of your feet, ankles, and legs, so the orthotic can accommodate your unique  foot structure and pathology.  Prescription orthotics are divided into two categories:  Functional orthotics are designed to control abnormal motion. They may be used to treat foot pain caused by abnormal motion; they can also be used to treat injuries such as shin splints or tendinitis. Functional orthotics are usually crafted of a semi-rigid material such as plastic or graphite.  Accommodative orthotics are softer and meant to provide additional cushioning and support. They can be used to treat diabetic foot ulcers, painful calluses on the bottom of the foot, and other uncomfortable conditions.  Podiatrists use orthotics to treat foot problems such as plantar fasciitis, bursitis, tendinitis, diabetic foot ulcers, and foot, ankle, and heel pain. Clinical research studies have shown that podiatrist-prescribed foot orthotics decrease foot pain and improve function.  Orthotics typically cost more than shoe inserts purchased in a retail store, but the additional cost is usually well worth it. Unlike shoe inserts, orthotics are molded to fit each individual foot, so you can be sure that your orthotics fit and do what they're supposed to do. Prescription orthotics are also made of top-notch materials and last many years when cared for properly. Insurance often helps pay for prescription orthotics.  What are Shoe Inserts?   You've seen them at the grocery store and at the mall. You've probably even seen them on TV and online. Shoe inserts are any kind of non-prescription foot support designed to be worn inside a shoe. Pre-packaged, mass produced, arch supports are shoe inserts. So are the  custom-made  insoles and foot supports that you can order online or at retail stores. Unless the device has been prescribed by a doctor and crafted for your specific foot, it's a shoe insert, not a custom orthotic device--despite what the ads might say.  Shoe inserts can be very helpful for a variety of foot ailments, including flat  arches and foot and leg pain. They can cushion your feet, provide comfort, and support your arches, but they can't correct biomechanical foot problems or cure long-standing foot issues.  The most common types of shoe inserts are:  Arch supports: Some people have high arches. Others have low arches or flat feet. Arch supports generally have a  bumped-up  appearance and are designed to support the foot's natural arch.   Insoles: Insoles slip into your shoe to provide extra cushioning and support. Insoles are often made of gel, foam, or plastic.   Heel liners: Heel liners, sometimes called heel pads or heel cups, provide extra cushioning in the heel region. They may be especially useful for patients who have foot pain caused by age-related thinning of the heels' natural fat pads.   Foot cushions: Do your shoes rub against your heel or your toes? Foot cushions come in many different shapes and sizes and can be used as a barrier between you and your shoe.  Choosing an Over-the-Counter Shoe Insert  Selecting a shoe insert from the wide variety of devices on the market can be overwhelming. Here are some podiatrist-tested tips to help you find the insert that best meets your needs:  Consider your health. Do you have diabetes? Problems with circulation? An over-the-counter insert may not be your best bet. Diabetes and poor circulation increase your risk of foot ulcers and infections, so schedule an appointment with a podiatrist. He or she can help you select a solution that won't cause additional health problems.   Think about the purpose. Are you planning to run a marathon, or do you just need a little arch support in your work shoes? Look for a product that fits your planned level of activity.   Bring your shoes. For the insert to be effective, it has to fit into your shoes. So bring your sneakers, dress shoes, or work boots--whatever you plan to wear with your insert. Look for an insert that will fit the contours of your  shoe.   Try them on. If all possible, slip the insert into your shoe and try it out. Walk around a little. How does it feel? Don't assume that feelings of pressure will go away with continued wear. (If you can't try the inserts at the store, ask about the store's return policy and hold on to your receipt.)    Please call one of the [facility] locations below to schedule an appointment. If you received a prescription please bring it with you to your appointment. Some locations are limited to what they carry.    Office Locations    [facility]  [address] [place] [address]  Home Medical Equipment, [address]   Phone: [phone]   Orthotics and Prosthetics, [address]   Phone: [phone]    [facility]   Home Medical Equipment   [address] [address]  Phone :[phone]  Orthotics and Prosthetics   [address], [facility] [address]  Phone:[phone]          [facility]  [address]  [address]   [place] [address]   Phone: [phone]    [facility] Professional [facility]ldg[facility]  [address]  [place] [address]  Phone: [phone]    [facility]  [address]  [place] [address]  Phone: [phone]  Fax: [phone]    [facility]   [address]  [place] [address]  Phone: [phone]    [facility]  [address]  [place] [address]  Phone: [phone]    [facility]  [address]  [address]  [place] [address]  Phone: [phone]    [place]   [address] [place] [address]   Phone: [phone]    WEARING YOUR CUSTOM FOOT ORTHOTICS   Most insurance plans cover one pair of orthotics per year. You must  check with your   insurance plan to see what your payment responsibility will be. Please call your   insurance company by calling the number on the back of your insurance card.   Orthotic's are non-refundable and non-returnable.   Orthotics are made of various designs. Some orthotics are covered with material that extends beyond your toes. If your orthotic is of this design, you will likely need to trim the toe end to get a proper fit. The insole from your shoe can be used as a template. Simply overlay the shoe insert on top of the custom orthotic. Align the heel end while tracing the length of the insert onto the custom orthotic. Use a large scissor to trim the toe end until you get a proper fit in the shoe.   The orthotic needs to be pushed as far back in the shoe as possible. The heel portion should not ride forward so as not to irritate your heel.   Orthotics are designed to work with socks. Excessive perspiration will shorten the life span of the orthotics. Remove the orthotic from the shoe frequently for proper drying.   The break-in period lasts for weeks. People new to orthotics will likely experience new aches and pains. The orthotic is forcing your foot into a new position. Arch, foot and leg muscle aches and fatigue are common during these weeks. Minor discomfort can be considered normal break in phenomenon. Start wearing your orthotic around your home your first day. Limited activity for one to two hours is recommended. You can increase one or two additional hours each day provided the aches and pains are subsiding. The degree of discomfort, fatigue and problems will dictate the speed of break in. You may require multiple weeks to work up to full time use.   Do not continue wearing your orthotics if they are creating problems such as blisters or sores. Do not hesitate to call the clinic to speak with a nurse regarding orthotic   break in, fit, trimming, etc. You may also need to see the doctor if the  orthotics are   simply not working out. Adjustments are sometimes made to improve orthotic   function.     Orthotics will only work in certain styles and types of shoes. Orthotics rarely work in dress shoes. Slip-ons, clogs, sandals and heels are particularly troublesome. Specially designed orthotics may be necessary for these types of shoes. Your custom orthotic was designed for activities that require appropriate walking or running shoes. Lace up athletic shoes, walking shoes or work boots should work appropriately. You may need a wider or longer shoe. Shoes with a removable  or insert work best. In general, you want to remove an insert from the shoe before placing the orthotic into the shoe. Shoes without a removable liner may not work as well.     When purchasing new shoes, bring your orthotics along to get a proper fit. Shop at stores that are familiar with orthotics.   Frequent washing of the orthotic may shorten the life span of the top cover. The top cover can be replaced but will generally last one to five years depending on use and foot perspiration.

## 2025-07-01 NOTE — LETTER
7/1/2025      Amy Sands  [address]  [facility] [address]      Dear Colleague,    Thank you for referring your patient, Amy Sands, to the [facility]. Please see a copy of my visit note below.    FOOT AND ANKLE SURGERY/PODIATRY Progress Note        ASSESSMENT:   Plantar Fasciitis left  Gastrosoleus Equinus       TREATMENT:  -Patient has pain along the plantar heel at insertion of plantar fascia consistent with plantar fasciitis.     -We discussed treatment options to include stretching exercises, anti-inflammatory medication, orthotics, steroid injections, physical therapy and a night splint.  Patient would like to proceed with steroid injection today.    -Injection site cleansed with an alcohol wipe. Injected 1 cc Kenalog 10/0.5 cc 2% lidocaine plain left heel. Patient tolerated the procedure well. Band aid applied.     -All questions invited and answered. I have referred her to [facility] for custom orthotics.     -I have asked her to follow-up after using the orthotics x3-4 weeks if symptoms continue.     Ozzie Guevara DPM  [facility] Podiatry/Foot & Ankle Surgery      HPI: I was asked to see Amy Sands today for left heel pain.  Patient reports that she has been seen at [facility] in the past and has been diagnosed with plantar fasciitis.  She has had ongoing pain for several months and has tried physical therapy, stretching exercises, over-the-counter inserts with no relief.  Patient does work on her feet.  Past medical history reviewed.      Past Medical History:   Diagnosis Date     Arthritis      Arthritis      Benign Pigmented Nevus     Created by Conversion  Replacement Utility updated for latest IMO load        Chest Pain     Created by Conversion        Chronic bronchitis (H)      Chronic bronchitis (H)      Chronic Bronchitis With Acute Exacerbation     Created by Conversion        Fibromyalgia      Fibromyalgia      History of  anesthesia complications     HARD WAKEUP AND LOW BP     History of anesthesia complications     HARD WAKEUP AND LOW BP     Hydronephrosis with urinary obstruction due to ureteral calculus      Joint Pain, Localized In The Knee     Created by Conversion        Kidney stone      Kidney stone      Obesity (BMI 35.0-39.9) with comorbidity (H) 06/02/2020     PONV (postoperative nausea and vomiting)     motion sickness     PONV (postoperative nausea and vomiting)     motion sickness     Pre-op exam 01/11/2019     Primary osteoarthritis of left knee 01/07/2016     Productive cough 06/14/2018     Rheumatoid arthritis involving right wrist, unspecified whether rheumatoid factor present (H) 07/06/2021     Right flank discomfort 03/20/2019     Right Rotator Cuff Tendonitis     Created by Conversion  Replacement Utility updated for latest IMO load        Rosacea      Rosacea      Rotator cuff tendonitis, right      Rotator cuff tendonitis, right      Sleep apnea     DOESNT USE CPAP     Sleep apnea     DOESNT USE CPAP     TB lung, latent 06/14/2018     Total knee replacement status 01/09/2016     Urinary urgency 03/20/2019     Visit for screening mammogram 06/14/2018       Past Surgical History:   Procedure Laterality Date     ARTHROSCOPY KNEE Left      COMBINED CYSTOSCOPY, INSERT STENT URETER(S) Right 3/25/2019    Procedure: CYSTOSCOPY, RIGHT URETEROSCOPY STENT INSERTION AND RETROGRADE PYELOGRAM;  Surgeon: Kenneth Lopez MD;  Location: [facility] Main OR;  Service: Urology     HYSTERECTOMY       OOPHORECTOMY       OTHER SURGICAL HISTORY      lysis of adhesions for bowel obstruction x 2     RELEASE CARPAL TUNNEL Bilateral      ZZC TOTAL KNEE ARTHROPLASTY Left 1/7/2016    Procedure: TOTAL KNEE  ARTHROPLASTY;  Surgeon: Boubacar Cortez MD;  Location: [facility] Main OR;  Service: Orthopedics       Allergies   Allergen Reactions     Dilaudid [Hydromorphone] Nausea and Vomiting     Hydrocodone Nausea and Vomiting      Toradol [Ketorolac] Nausea and Vomiting         Current Outpatient Medications:      albuterol (PROAIR HFA) 90 mcg/actuation inhaler, [ALBUTEROL (PROAIR HFA) 90 MCG/ACTUATION INHALER] Inhale 2 puffs every 6 (six) hours as needed for wheezing., Disp: 1 Inhaler, Rfl: 1     budesonide-formoterol (SYMBICORT) 160-4.5 MCG/ACT Inhaler, Inhale 2 puffs into the lungs 2 times daily, Disp: 10.2 g, Rfl: 11     folic acid (FOLVITE) 1 MG tablet, [FOLIC ACID (FOLVITE) 1 MG TABLET] Take 1 mg by mouth., Disp: , Rfl:      guaiFENesin-codeine (ROBITUSSIN AC) 100-10 MG/5ML solution, Take 5-10 mLs by mouth every 4 hours as needed for cough, Disp: 473 mL, Rfl: 0     ipratropium - albuterol 0.5 mg/2.5 mg/3 mL (DUONEB) 0.5-2.5 (3) MG/3ML neb solution, Take 1 vial (3 mLs) by nebulization every 6 hours as needed for shortness of breath, wheezing or cough Take 3-4 times daily while acutely ill, then back off to q6h PRN, Disp: 90 mL, Rfl: 4     methotrexate 2.5 MG tablet, Take 10 tablets by mouth once a week, Disp: , Rfl:      pregabalin (LYRICA) 75 MG capsule, Take 75 mg by mouth daily, Disp: , Rfl:      tiotropium (SPIRIVA HANDIHALER) 18 MCG inhaled capsule, Inhale 1 capsule (18 mcg) into the lungs daily, Disp: 90 capsule, Rfl: 4    Family History   Problem Relation Age of Onset     Cancer Mother      Cancer Father      Clotting Disorder Sister      Cancer Sister      Kidney Disease Brother      Prostate Cancer Brother      Cancer Brother      Urolithiasis No family hx of      Diabetes No family hx of      Gout No family hx of      Heart Disease No family hx of        Social History     Socioeconomic History     Marital status:      Spouse name: Not on file     Number of children: Not on file     Years of education: Not on file     Highest education level: Not on file   Occupational History     Not on file   Tobacco Use     Smoking status: Never     Passive exposure: Yes     Smokeless tobacco: Never   Vaping Use     Vaping status:  Never Used   Substance and Sexual Activity     Alcohol use: No     Drug use: No     Sexual activity: Not on file   Other Topics Concern     Not on file   Social History Narrative     Not on file     Social Drivers of Health     Financial Resource Strain: High Risk (9/26/2023)    Financial Resource Strain      Within the past 12 months, have you or your family members you live with been unable to get utilities (heat, electricity) when it was really needed?: Yes   Food Insecurity: Low Risk  (9/26/2023)    Food Insecurity      Within the past 12 months, did you worry that your food would run out before you got money to buy more?: No      Within the past 12 months, did the food you bought just not last and you didn t have money to get more?: No   Transportation Needs: Low Risk  (9/26/2023)    Transportation Needs      Within the past 12 months, has lack of transportation kept you from medical appointments, getting your medicines, non-medical meetings or appointments, work, or from getting things that you need?: No   Physical Activity: Not on file   Stress: Not on file   Social Connections: Not on file   Interpersonal Safety: Low Risk  (4/29/2024)    Interpersonal Safety      Do you feel physically and emotionally safe where you currently live?: Yes      Within the past 12 months, have you been hit, slapped, kicked or otherwise physically hurt by someone?: No      Within the past 12 months, have you been humiliated or emotionally abused in other ways by your partner or ex-partner?: No   Housing Stability: Low Risk  (9/26/2023)    Housing Stability      Do you have housing? : Yes      Are you worried about losing your housing?: No       Review of Systems - 10 point Review of Systems is negative except for left heel pain which is noted in HPI.    OBJECTIVE:  Appearance: alert, well appearing, and in no distress.    General appearance: Patient is alert and fully cooperative with history & exam.  No sign of distress is noted  during the visit.     Psychiatric: Affect is pleasant & appropriate.  Patient appears motivated to improve health.     Respiratory: Breathing is regular & unlabored while sitting.     HEENT: Hearing is intact to spoken word.  Speech is clear.  No gross evidence of visual impairment that would impact ambulation.    Vascular: Dorsalis pedis palpable bilateral.   Dermatologic: Turgor and texture are within normal limits. No coloration or temperature changes. No primary or secondary lesions noted.  Neurologic: All epicritic and proprioceptive sensations are grossly intact bilateral.  Musculoskeletal: Mild pain along the plantar left heel at the insertion of the plantar fascia. Limited ankle dorsiflexion with knee extended and flexed.         Again, thank you for allowing me to participate in the care of your patient.        Sincerely,        Ozzie Guevara DPM    Electronically signed

## 2025-07-01 NOTE — PROGRESS NOTES
FOOT AND ANKLE SURGERY/PODIATRY Progress Note        ASSESSMENT:   Plantar Fasciitis left  Gastrosoleus Equinus       TREATMENT:  -Patient has pain along the plantar heel at insertion of plantar fascia consistent with plantar fasciitis.     -We discussed treatment options to include stretching exercises, anti-inflammatory medication, orthotics, steroid injections, physical therapy and a night splint.  Patient would like to proceed with steroid injection today.    -Injection site cleansed with an alcohol wipe. Injected 1 cc Kenalog 10/0.5 cc 2% lidocaine plain left heel. Patient tolerated the procedure well. Band aid applied.     -All questions invited and answered. I have referred her to [facility] for custom orthotics.     -I have asked her to follow-up after using the orthotics x3-4 weeks if symptoms continue.     Ozzie Guevara DPM  [facility] Podiatry/Foot & Ankle Surgery      HPI: I was asked to see Amy Sands today for left heel pain.  Patient reports that she has been seen at [facility] in the past and has been diagnosed with plantar fasciitis.  She has had ongoing pain for several months and has tried physical therapy, stretching exercises, over-the-counter inserts with no relief.  Patient does work on her feet.  Past medical history reviewed.      Past Medical History:   Diagnosis Date    Arthritis     Arthritis     Benign Pigmented Nevus     Created by Conversion  Replacement Utility updated for latest IMO load       Chest Pain     Created by Conversion       Chronic bronchitis (H)     Chronic bronchitis (H)     Chronic Bronchitis With Acute Exacerbation     Created by Conversion       Fibromyalgia     Fibromyalgia     History of anesthesia complications     HARD WAKEUP AND LOW BP    History of anesthesia complications     HARD WAKEUP AND LOW BP    Hydronephrosis with urinary obstruction due to ureteral calculus     Joint Pain, Localized In The Knee     Created by Conversion        Kidney stone     Kidney stone     Obesity (BMI 35.0-39.9) with comorbidity (H) 06/02/2020    PONV (postoperative nausea and vomiting)     motion sickness    PONV (postoperative nausea and vomiting)     motion sickness    Pre-op exam 01/11/2019    Primary osteoarthritis of left knee 01/07/2016    Productive cough 06/14/2018    Rheumatoid arthritis involving right wrist, unspecified whether rheumatoid factor present (H) 07/06/2021    Right flank discomfort 03/20/2019    Right Rotator Cuff Tendonitis     Created by Conversion  Replacement Utility updated for latest IMO load       Rosacea     Rosacea     Rotator cuff tendonitis, right     Rotator cuff tendonitis, right     Sleep apnea     DOESNT USE CPAP    Sleep apnea     DOESNT USE CPAP    TB lung, latent 06/14/2018    Total knee replacement status 01/09/2016    Urinary urgency 03/20/2019    Visit for screening mammogram 06/14/2018       Past Surgical History:   Procedure Laterality Date    ARTHROSCOPY KNEE Left     COMBINED CYSTOSCOPY, INSERT STENT URETER(S) Right 3/25/2019    Procedure: CYSTOSCOPY, RIGHT URETEROSCOPY STENT INSERTION AND RETROGRADE PYELOGRAM;  Surgeon: Kenneth Lopez MD;  Location: [facility];  Service: Urology    HYSTERECTOMY      OOPHORECTOMY      OTHER SURGICAL HISTORY      lysis of adhesions for bowel obstruction x 2    RELEASE CARPAL TUNNEL Bilateral     ZZC TOTAL KNEE ARTHROPLASTY Left 1/7/2016    Procedure: TOTAL KNEE  ARTHROPLASTY;  Surgeon: Boubacar Cortez MD;  Location: [facility] OR;  Service: Orthopedics       Allergies   Allergen Reactions    Dilaudid [Hydromorphone] Nausea and Vomiting    Hydrocodone Nausea and Vomiting    Toradol [Ketorolac] Nausea and Vomiting         Current Outpatient Medications:     albuterol (PROAIR HFA) 90 mcg/actuation inhaler, [ALBUTEROL (PROAIR HFA) 90 MCG/ACTUATION INHALER] Inhale 2 puffs every 6 (six) hours as needed for wheezing., Disp: 1 Inhaler, Rfl: 1    budesonide-formoterol  (SYMBICORT) 160-4.5 MCG/ACT Inhaler, Inhale 2 puffs into the lungs 2 times daily, Disp: 10.2 g, Rfl: 11    folic acid (FOLVITE) 1 MG tablet, [FOLIC ACID (FOLVITE) 1 MG TABLET] Take 1 mg by mouth., Disp: , Rfl:     guaiFENesin-codeine (ROBITUSSIN AC) 100-10 MG/5ML solution, Take 5-10 mLs by mouth every 4 hours as needed for cough, Disp: 473 mL, Rfl: 0    ipratropium - albuterol 0.5 mg/2.5 mg/3 mL (DUONEB) 0.5-2.5 (3) MG/3ML neb solution, Take 1 vial (3 mLs) by nebulization every 6 hours as needed for shortness of breath, wheezing or cough Take 3-4 times daily while acutely ill, then back off to q6h PRN, Disp: 90 mL, Rfl: 4    methotrexate 2.5 MG tablet, Take 10 tablets by mouth once a week, Disp: , Rfl:     pregabalin (LYRICA) 75 MG capsule, Take 75 mg by mouth daily, Disp: , Rfl:     tiotropium (SPIRIVA HANDIHALER) 18 MCG inhaled capsule, Inhale 1 capsule (18 mcg) into the lungs daily, Disp: 90 capsule, Rfl: 4    Family History   Problem Relation Age of Onset    Cancer Mother     Cancer Father     Clotting Disorder Sister     Cancer Sister     Kidney Disease Brother     Prostate Cancer Brother     Cancer Brother     Urolithiasis No family hx of     Diabetes No family hx of     Gout No family hx of     Heart Disease No family hx of        Social History     Socioeconomic History    Marital status:      Spouse name: Not on file    Number of children: Not on file    Years of education: Not on file    Highest education level: Not on file   Occupational History    Not on file   Tobacco Use    Smoking status: Never     Passive exposure: Yes    Smokeless tobacco: Never   Vaping Use    Vaping status: Never Used   Substance and Sexual Activity    Alcohol use: No    Drug use: No    Sexual activity: Not on file   Other Topics Concern    Not on file   Social History Narrative    Not on file     Social Drivers of Health     Financial Resource Strain: High Risk (9/26/2023)    Financial Resource Strain     Within the past  12 months, have you or your family members you live with been unable to get utilities (heat, electricity) when it was really needed?: Yes   Food Insecurity: Low Risk  (9/26/2023)    Food Insecurity     Within the past 12 months, did you worry that your food would run out before you got money to buy more?: No     Within the past 12 months, did the food you bought just not last and you didn t have money to get more?: No   Transportation Needs: Low Risk  (9/26/2023)    Transportation Needs     Within the past 12 months, has lack of transportation kept you from medical appointments, getting your medicines, non-medical meetings or appointments, work, or from getting things that you need?: No   Physical Activity: Not on file   Stress: Not on file   Social Connections: Not on file   Interpersonal Safety: Low Risk  (4/29/2024)    Interpersonal Safety     Do you feel physically and emotionally safe where you currently live?: Yes     Within the past 12 months, have you been hit, slapped, kicked or otherwise physically hurt by someone?: No     Within the past 12 months, have you been humiliated or emotionally abused in other ways by your partner or ex-partner?: No   Housing Stability: Low Risk  (9/26/2023)    Housing Stability     Do you have housing? : Yes     Are you worried about losing your housing?: No       Review of Systems - 10 point Review of Systems is negative except for left heel pain which is noted in HPI.    OBJECTIVE:  Appearance: alert, well appearing, and in no distress.    General appearance: Patient is alert and fully cooperative with history & exam.  No sign of distress is noted during the visit.     Psychiatric: Affect is pleasant & appropriate.  Patient appears motivated to improve health.     Respiratory: Breathing is regular & unlabored while sitting.     HEENT: Hearing is intact to spoken word.  Speech is clear.  No gross evidence of visual impairment that would impact ambulation.    Vascular: Dorsalis  pedis palpable bilateral.   Dermatologic: Turgor and texture are within normal limits. No coloration or temperature changes. No primary or secondary lesions noted.  Neurologic: All epicritic and proprioceptive sensations are grossly intact bilateral.  Musculoskeletal: Mild pain along the plantar left heel at the insertion of the plantar fascia. Limited ankle dorsiflexion with knee extended and flexed.

## 2025-07-22 ENCOUNTER — PATIENT OUTREACH (OUTPATIENT)
Dept: CARE COORDINATION | Facility: CLINIC | Age: 65
End: 2025-07-22
Payer: COMMERCIAL

## 2025-07-31 ENCOUNTER — TELEPHONE (OUTPATIENT)
Dept: PULMONOLOGY | Facility: CLINIC | Age: 65
End: 2025-07-31
Payer: COMMERCIAL

## 2025-07-31 DIAGNOSIS — J45.50 SEVERE PERSISTENT ASTHMA WITHOUT COMPLICATION (H): Primary | ICD-10-CM

## 2025-07-31 DIAGNOSIS — J44.1 OBSTRUCTIVE CHRONIC BRONCHITIS WITH EXACERBATION (H): ICD-10-CM

## 2025-07-31 NOTE — TELEPHONE ENCOUNTER
Attempted to call patient.  Unable to reach.  Left VM message that new prescription for the Trelegy ellipta has been sent to [facility] in [place].

## 2025-07-31 NOTE — TELEPHONE ENCOUNTER
[facility] Call Center    Phone Message    May a detailed message be left on voicemail: yes     Reason for Call: Other: Pt called in asking to have medication filled that was discussed at previous appointment, for  Glenis chen - please review, follow up with the patient thank you    Action Taken: Message routed to:  [facility] pulm     Travel Screening: Not Applicable     Date of Service:

## 2025-08-11 ENCOUNTER — TRANSFERRED RECORDS (OUTPATIENT)
Dept: HEALTH INFORMATION MANAGEMENT | Facility: CLINIC | Age: 65
End: 2025-08-11
Payer: COMMERCIAL